# Patient Record
Sex: FEMALE | NOT HISPANIC OR LATINO | Employment: UNEMPLOYED | URBAN - METROPOLITAN AREA
[De-identification: names, ages, dates, MRNs, and addresses within clinical notes are randomized per-mention and may not be internally consistent; named-entity substitution may affect disease eponyms.]

---

## 2020-09-02 ENCOUNTER — APPOINTMENT (OUTPATIENT)
Dept: GENERAL RADIOLOGY | Facility: HOSPITAL | Age: 75
End: 2020-09-02

## 2020-09-02 ENCOUNTER — HOSPITAL ENCOUNTER (EMERGENCY)
Facility: HOSPITAL | Age: 75
Discharge: HOME | End: 2020-09-03
Attending: EMERGENCY MEDICINE | Admitting: EMERGENCY MEDICINE

## 2020-09-02 DIAGNOSIS — J84.10 PULMONARY FIBROSIS (HCC): ICD-10-CM

## 2020-09-02 DIAGNOSIS — I47.1 PAROXYSMAL SVT (SUPRAVENTRICULAR TACHYCARDIA) (HCC): Primary | ICD-10-CM

## 2020-09-02 LAB
ALBUMIN SERPL-MCNC: 4.2 G/DL (ref 3.5–5.2)
ALBUMIN/GLOB SERPL: 1.2 G/DL
ALP SERPL-CCNC: 109 U/L (ref 39–117)
ALT SERPL W P-5'-P-CCNC: 21 U/L (ref 1–33)
ANION GAP SERPL CALCULATED.3IONS-SCNC: 9 MMOL/L (ref 5–15)
AST SERPL-CCNC: 28 U/L (ref 1–32)
BASOPHILS # BLD AUTO: 0.06 10*3/MM3 (ref 0–0.2)
BASOPHILS NFR BLD AUTO: 0.7 % (ref 0–1.5)
BILIRUB SERPL-MCNC: 0.2 MG/DL (ref 0–1.2)
BUN SERPL-MCNC: 12 MG/DL (ref 8–23)
BUN/CREAT SERPL: 16.9 (ref 7–25)
CALCIUM SPEC-SCNC: 9 MG/DL (ref 8.6–10.5)
CHLORIDE SERPL-SCNC: 100 MMOL/L (ref 98–107)
CO2 SERPL-SCNC: 26 MMOL/L (ref 22–29)
CREAT SERPL-MCNC: 0.71 MG/DL (ref 0.57–1)
DEPRECATED RDW RBC AUTO: 46.5 FL (ref 37–54)
EOSINOPHIL # BLD AUTO: 0.61 10*3/MM3 (ref 0–0.4)
EOSINOPHIL NFR BLD AUTO: 7 % (ref 0.3–6.2)
ERYTHROCYTE [DISTWIDTH] IN BLOOD BY AUTOMATED COUNT: 13.2 % (ref 12.3–15.4)
GFR SERPL CREATININE-BSD FRML MDRD: 80 ML/MIN/1.73
GFR SERPL CREATININE-BSD FRML MDRD: 97 ML/MIN/1.73
GLOBULIN UR ELPH-MCNC: 3.4 GM/DL
GLUCOSE SERPL-MCNC: 116 MG/DL (ref 65–99)
HCT VFR BLD AUTO: 40.7 % (ref 34–46.6)
HGB BLD-MCNC: 13.2 G/DL (ref 12–15.9)
HOLD SPECIMEN: NORMAL
HOLD SPECIMEN: NORMAL
IMM GRANULOCYTES # BLD AUTO: 0.05 10*3/MM3 (ref 0–0.05)
IMM GRANULOCYTES NFR BLD AUTO: 0.6 % (ref 0–0.5)
LIPASE SERPL-CCNC: 29 U/L (ref 13–60)
LYMPHOCYTES # BLD AUTO: 1.58 10*3/MM3 (ref 0.7–3.1)
LYMPHOCYTES NFR BLD AUTO: 18 % (ref 19.6–45.3)
MCH RBC QN AUTO: 31 PG (ref 26.6–33)
MCHC RBC AUTO-ENTMCNC: 32.4 G/DL (ref 31.5–35.7)
MCV RBC AUTO: 95.5 FL (ref 79–97)
MONOCYTES # BLD AUTO: 1.03 10*3/MM3 (ref 0.1–0.9)
MONOCYTES NFR BLD AUTO: 11.7 % (ref 5–12)
NEUTROPHILS NFR BLD AUTO: 5.44 10*3/MM3 (ref 1.7–7)
NEUTROPHILS NFR BLD AUTO: 62 % (ref 42.7–76)
NRBC BLD AUTO-RTO: 0 /100 WBC (ref 0–0.2)
NT-PROBNP SERPL-MCNC: 95.3 PG/ML (ref 0–1800)
PLATELET # BLD AUTO: 263 10*3/MM3 (ref 140–450)
PMV BLD AUTO: 9.6 FL (ref 6–12)
POTASSIUM SERPL-SCNC: 3.9 MMOL/L (ref 3.5–5.2)
PROT SERPL-MCNC: 7.6 G/DL (ref 6–8.5)
RBC # BLD AUTO: 4.26 10*6/MM3 (ref 3.77–5.28)
SODIUM SERPL-SCNC: 135 MMOL/L (ref 136–145)
TROPONIN T SERPL-MCNC: <0.01 NG/ML (ref 0–0.03)
TROPONIN T SERPL-MCNC: <0.01 NG/ML (ref 0–0.03)
WBC # BLD AUTO: 8.77 10*3/MM3 (ref 3.4–10.8)
WHOLE BLOOD HOLD SPECIMEN: NORMAL

## 2020-09-02 PROCEDURE — 93005 ELECTROCARDIOGRAM TRACING: CPT | Performed by: EMERGENCY MEDICINE

## 2020-09-02 PROCEDURE — 85025 COMPLETE CBC W/AUTO DIFF WBC: CPT | Performed by: EMERGENCY MEDICINE

## 2020-09-02 PROCEDURE — 83880 ASSAY OF NATRIURETIC PEPTIDE: CPT | Performed by: EMERGENCY MEDICINE

## 2020-09-02 PROCEDURE — 99284 EMERGENCY DEPT VISIT MOD MDM: CPT

## 2020-09-02 PROCEDURE — 71045 X-RAY EXAM CHEST 1 VIEW: CPT

## 2020-09-02 PROCEDURE — 83690 ASSAY OF LIPASE: CPT | Performed by: EMERGENCY MEDICINE

## 2020-09-02 PROCEDURE — 84484 ASSAY OF TROPONIN QUANT: CPT | Performed by: EMERGENCY MEDICINE

## 2020-09-02 PROCEDURE — 80053 COMPREHEN METABOLIC PANEL: CPT | Performed by: EMERGENCY MEDICINE

## 2020-09-02 RX ORDER — ASPIRIN 81 MG/1
324 TABLET, CHEWABLE ORAL ONCE
Status: COMPLETED | OUTPATIENT
Start: 2020-09-02 | End: 2020-09-02

## 2020-09-02 RX ORDER — SODIUM CHLORIDE 0.9 % (FLUSH) 0.9 %
10 SYRINGE (ML) INJECTION AS NEEDED
Status: DISCONTINUED | OUTPATIENT
Start: 2020-09-02 | End: 2020-09-03 | Stop reason: HOSPADM

## 2020-09-02 RX ADMIN — ASPIRIN 243 MG: 81 TABLET, CHEWABLE ORAL at 20:13

## 2020-09-03 VITALS
OXYGEN SATURATION: 94 % | SYSTOLIC BLOOD PRESSURE: 124 MMHG | WEIGHT: 200 LBS | DIASTOLIC BLOOD PRESSURE: 91 MMHG | HEART RATE: 92 BPM | BODY MASS INDEX: 37.76 KG/M2 | TEMPERATURE: 98.2 F | RESPIRATION RATE: 16 BRPM | HEIGHT: 61 IN

## 2020-09-03 LAB — WHOLE BLOOD HOLD SPECIMEN: NORMAL

## 2020-09-03 NOTE — ED PROVIDER NOTES
Subjective   Ms. Destiney Green is a 75 y.o female presenting to the emergency department via EMS with complaints of chest pain. Today at approximately 1800, she complains of sudden chest pain, shortness of breath, and weakness while at home. She moved to the couch and then felt she was unable to move. EMS was called and on arrival, they note she was in supraventricular tachycardia for which she was successfully cardioverted via Adenosine 6 mg. While in route, she had an episode of vomiting and EMS administered Zofran 4 mg and she confirms her nausea is currently resolved. She confirms feeling better overall but complains of persistent chest tightness, fatigue, and weakness. She confirms she had a similar episode 3 months ago but today's was far worse. She denies abdominal pain, fever, and chills. There are no other acute symptoms at this time.      History provided by:  Patient and EMS personnel  Chest Pain   Pain location:  Substernal area  Pain quality: tightness    Pain radiates to:  Does not radiate  Pain severity:  Moderate  Onset quality:  Sudden  Duration:  2 hours  Timing:  Constant  Progression:  Unchanged  Chronicity:  New  Relieved by:  None tried  Worsened by:  Nothing  Ineffective treatments:  None tried  Associated symptoms: fatigue, nausea, palpitations, shortness of breath, vomiting and weakness    Associated symptoms: no abdominal pain, no diaphoresis, no fever and no syncope    Nausea:     Progression:  Resolved  Vomiting:     Number of occurrences:  1    Progression:  Resolved      Review of Systems   Constitutional: Positive for fatigue. Negative for chills, diaphoresis and fever.   Respiratory: Positive for chest tightness and shortness of breath.    Cardiovascular: Positive for chest pain and palpitations. Negative for syncope.   Gastrointestinal: Positive for nausea and vomiting. Negative for abdominal pain, blood in stool, constipation and diarrhea.   Neurological: Positive for weakness.    All other systems reviewed and are negative.      No past medical history on file.    No Known Allergies    No past surgical history on file.    No family history on file.    Social History     Socioeconomic History   • Marital status:      Spouse name: Not on file   • Number of children: Not on file   • Years of education: Not on file   • Highest education level: Not on file         Objective   Physical Exam   Constitutional: She is oriented to person, place, and time. She appears well-developed and well-nourished. No distress. She is not intubated.   HENT:   Head: Normocephalic and atraumatic.   Eyes: Conjunctivae are normal. No scleral icterus.   Neck: Normal range of motion.   Cardiovascular: Regular rhythm and intact distal pulses. Tachycardia present. Exam reveals no gallop and no friction rub.   No murmur heard.  Pulmonary/Chest: Effort normal and breath sounds normal. No accessory muscle usage or stridor. No tachypnea and no bradypnea. She is not intubated. No respiratory distress. She has no decreased breath sounds. She has no wheezes. She has no rhonchi. She has no rales.   Lungs are clear to auscultation bilaterally. Airway is intact.   Abdominal: Soft. There is no tenderness. There is no rigidity, no rebound, no guarding and no CVA tenderness.   Musculoskeletal: Normal range of motion. She exhibits no edema, tenderness or deformity.   Neurological: She is alert and oriented to person, place, and time.   Skin: Skin is warm and dry. She is not diaphoretic.   Psychiatric: She has a normal mood and affect. Her behavior is normal.   Nursing note and vitals reviewed.      Procedures         ED Course  ED Course as of Sep 03 0033   Wed Sep 02, 2020   2030   COVID-19 RISK SCREEN    Has the patient had close contact without PPE with a lab confirmed COVID-19 (+) person or a person under investigation (PUI) for COVID-19 infection?  -- No     Has the patient had respiratory symptoms, worsened/new cough  and/or SOA, unexplained fever, or sudden loss of smell and/or taste in the past 7 days? --  Yes    Does the patient have baseline higher exposure risk such as working in healthcare field or currently residing in healthcare facility?  --  No          [HV]   2106 Troponin T: <0.010 [RS]   2106 proBNP: 95.3 [RS]   2240 Patient remains stable.  Vital signs are within normal limits.  Heart rate 88 bpm and a normal sinus rhythm.  Obtaining a second set of cardiac test.    [RS]   2339 Troponin T: <0.010 [RS]   2339 Patient is resting comfortably.  Patient's cardiac enzymes have remained negative.  We will plan to discharge her home to follow-up with our rhythm clinic as well as with cardiology. I had a discussion with the patient/family regarding diagnosis, diagnostic results, treatment plan, and medications.  The patient/family indicated understanding of these instructions.  I spent adequate time at the bedside proceeding discharge necessary to personally discuss the aftercare instructions, giving patient education, providing explanations of the results of our evaluations/findings, and my decision making to assure that the patient/family understand the plan of care.  Time was allotted to answer questions at that time and throughout the ED course.  Emphasis was placed on timely follow-up after discharge.  I also discussed the potential for the development of an acute emergent condition requiring further evaluation, admission, or even surgical intervention. I discussed that we found nothing during the visit today indicating the need for further workup, admission, or the presence of an unstable medical condition.  I encouraged the patient to return to the emergency department immediately for ANY concerns, worsening, new complaints, or if symptoms persist and unable to seek follow-up in a timely fashion.  The patient/family expressed understanding and agreement with this plan.     [RS]      ED Course User Index  [HV]  Holly Edgar  [RS] Lee Castro MD     Recent Results (from the past 24 hour(s))   Troponin    Collection Time: 09/02/20  8:13 PM   Result Value Ref Range    Troponin T <0.010 0.000 - 0.030 ng/mL   Comprehensive Metabolic Panel    Collection Time: 09/02/20  8:13 PM   Result Value Ref Range    Glucose 116 (H) 65 - 99 mg/dL    BUN 12 8 - 23 mg/dL    Creatinine 0.71 0.57 - 1.00 mg/dL    Sodium 135 (L) 136 - 145 mmol/L    Potassium 3.9 3.5 - 5.2 mmol/L    Chloride 100 98 - 107 mmol/L    CO2 26.0 22.0 - 29.0 mmol/L    Calcium 9.0 8.6 - 10.5 mg/dL    Total Protein 7.6 6.0 - 8.5 g/dL    Albumin 4.20 3.50 - 5.20 g/dL    ALT (SGPT) 21 1 - 33 U/L    AST (SGOT) 28 1 - 32 U/L    Alkaline Phosphatase 109 39 - 117 U/L    Total Bilirubin 0.2 0.0 - 1.2 mg/dL    eGFR Non African Amer 80 >60 mL/min/1.73    eGFR  African Amer 97 >60 mL/min/1.73    Globulin 3.4 gm/dL    A/G Ratio 1.2 g/dL    BUN/Creatinine Ratio 16.9 7.0 - 25.0    Anion Gap 9.0 5.0 - 15.0 mmol/L   Lipase    Collection Time: 09/02/20  8:13 PM   Result Value Ref Range    Lipase 29 13 - 60 U/L   BNP    Collection Time: 09/02/20  8:13 PM   Result Value Ref Range    proBNP 95.3 0.0-1,800.0 pg/mL   Light Blue Top    Collection Time: 09/02/20  8:13 PM   Result Value Ref Range    Extra Tube hold for add-on    Green Top (Gel)    Collection Time: 09/02/20  8:13 PM   Result Value Ref Range    Extra Tube Hold for add-ons.    Gold Top - SST    Collection Time: 09/02/20  8:13 PM   Result Value Ref Range    Extra Tube Hold for add-ons.    Lavender Top    Collection Time: 09/02/20 10:57 PM   Result Value Ref Range    Extra Tube hold for add-on    CBC Auto Differential    Collection Time: 09/02/20 10:57 PM   Result Value Ref Range    WBC 8.77 3.40 - 10.80 10*3/mm3    RBC 4.26 3.77 - 5.28 10*6/mm3    Hemoglobin 13.2 12.0 - 15.9 g/dL    Hematocrit 40.7 34.0 - 46.6 %    MCV 95.5 79.0 - 97.0 fL    MCH 31.0 26.6 - 33.0 pg    MCHC 32.4 31.5 - 35.7 g/dL    RDW 13.2 12.3 - 15.4 %  "   RDW-SD 46.5 37.0 - 54.0 fl    MPV 9.6 6.0 - 12.0 fL    Platelets 263 140 - 450 10*3/mm3    Neutrophil % 62.0 42.7 - 76.0 %    Lymphocyte % 18.0 (L) 19.6 - 45.3 %    Monocyte % 11.7 5.0 - 12.0 %    Eosinophil % 7.0 (H) 0.3 - 6.2 %    Basophil % 0.7 0.0 - 1.5 %    Immature Grans % 0.6 (H) 0.0 - 0.5 %    Neutrophils, Absolute 5.44 1.70 - 7.00 10*3/mm3    Lymphocytes, Absolute 1.58 0.70 - 3.10 10*3/mm3    Monocytes, Absolute 1.03 (H) 0.10 - 0.90 10*3/mm3    Eosinophils, Absolute 0.61 (H) 0.00 - 0.40 10*3/mm3    Basophils, Absolute 0.06 0.00 - 0.20 10*3/mm3    Immature Grans, Absolute 0.05 0.00 - 0.05 10*3/mm3    nRBC 0.0 0.0 - 0.2 /100 WBC   Troponin    Collection Time: 09/02/20 10:57 PM   Result Value Ref Range    Troponin T <0.010 0.000 - 0.030 ng/mL     Note: In addition to lab results from this visit, the labs listed above may include labs taken at another facility or during a different encounter within the last 24 hours. Please correlate lab times with ED admission and discharge times for further clarification of the services performed during this visit.    XR Chest 1 View   Final Result      1. Cardiomegaly with tortuous likely ectatic aorta.   2. There is reticulonodular interstitial change right greater than left lung which could be acute or chronic. If acute asymmetric edema, infection, atypical infection or underlying lung nodules would be included in the differential. There are no prior   films available to confirm stability. Consider CT follow-up.      Signer Name: Bridgett Ibrahim MD    Signed: 9/2/2020 8:42 PM    Workstation Name: [id number]     [facility]        Vitals:    09/02/20 1959 09/02/20 2000 09/02/20 2003 09/02/20 2358   BP:   127/75 124/91   Pulse:   107 92   Resp:   16    Temp:  98.2 °F (36.8 °C)     TempSrc:  Oral     SpO2:   94% 94%   Weight: 90.7 kg (200 lb)      Height: 155 cm (61.02\")        Medications   sodium chloride 0.9 % flush 10 mL (has no " administration in time range)   aspirin chewable tablet 324 mg (243 mg Oral Given 9/2/20 2013)     ECG/EMG Results (last 24 hours)     Procedure Component Value Units Date/Time    ECG 12 Lead [[id number]] Collected:  09/02/20 2002     Updated:  09/02/20 2004    Narrative:       Test Reason : chest pain  Blood Pressure : **/** mmHG  Vent. Rate : 107 BPM     Atrial Rate : 107 BPM     P-R Int : 158 ms          QRS Dur : 122 ms      QT Int : 386 ms       P-R-T Axes : 043 004 012 degrees     QTc Int : 515 ms    Sinus tachycardia  Right bundle branch block  Inferior infarct , age undetermined  Abnormal ECG  No previous ECGs available  Confirmed by JOSHUA FARIA MD ([id number]) on 9/2/2020 8:04:24 PM    Referred By:  GIANA           Confirmed By:JOSHUA FARIA MD        ECG 12 Lead   Final Result   Test Reason : 2nd set   Blood Pressure : **/** mmHG   Vent. Rate : 090 BPM     Atrial Rate : 090 BPM      P-R Int : 160 ms          QRS Dur : 122 ms       QT Int : 410 ms       P-R-T Axes : 050 -01 018 degrees      QTc Int : 501 ms      Normal sinus rhythm   Right bundle branch block   Inferior infarct (cited on or before 02-SEP-2020)   Abnormal ECG   When compared with ECG of 02-SEP-2020 20:02,   No significant change was found   Confirmed by JOSHUA FARIA MD ([id number]) on 9/3/2020 12:02:36 AM      Referred By:             Confirmed By:JOSHUA FARIA MD      ECG 12 Lead   Final Result   Test Reason : chest pain   Blood Pressure : **/** mmHG   Vent. Rate : 107 BPM     Atrial Rate : 107 BPM      P-R Int : 158 ms          QRS Dur : 122 ms       QT Int : 386 ms       P-R-T Axes : 043 004 012 degrees      QTc Int : 515 ms      Sinus tachycardia   Right bundle branch block   Inferior infarct , age undetermined   Abnormal ECG   No previous ECGs available   Confirmed by JOSHUA FARIA MD ([id number]) on 9/2/2020 8:04:24 PM      Referred By:  GIANA           Confirmed By:JOSHUA FARIA MD                                                 [facility]  Number of  Diagnoses or Management Options  Paroxysmal SVT (supraventricular tachycardia) (CMS/HCC):   Pulmonary fibrosis (CMS/HCC):      Amount and/or Complexity of Data Reviewed  Clinical lab tests: reviewed  Tests in the radiology section of CPT®: reviewed  Obtain history from someone other than the patient: yes  Independent visualization of images, tracings, or specimens: yes        Final diagnoses:   Paroxysmal SVT (supraventricular tachycardia) (CMS/HCC)   Pulmonary fibrosis (CMS/HCC)     EMR Dragon/Transcription disclaimer:   Much of this encounter note is an electronic transcription/translation of spoken language to printed text. The electronic translation of spoken language may permit erroneous, or at times, nonsensical words or phrases to be inadvertently transcribed; Although I have reviewed the note for such errors, some may still exist.       Documentation assistance provided by андрей Edgar.  Information recorded by the андрей was done at my direction and has been verified and validated by me.     Holly Edgar  09/02/20 2030       Lee Castro MD  09/03/20 0033

## 2020-09-09 ENCOUNTER — CONSULT (OUTPATIENT)
Dept: CARDIOLOGY | Facility: CLINIC | Age: 75
End: 2020-09-09

## 2020-09-09 VITALS
TEMPERATURE: 96.9 F | WEIGHT: 198 LBS | HEART RATE: 106 BPM | BODY MASS INDEX: 37.38 KG/M2 | OXYGEN SATURATION: 97 % | HEIGHT: 61 IN | DIASTOLIC BLOOD PRESSURE: 72 MMHG | SYSTOLIC BLOOD PRESSURE: 132 MMHG

## 2020-09-09 DIAGNOSIS — I47.1 SUSTAINED SVT (HCC): Primary | ICD-10-CM

## 2020-09-09 PROBLEM — I47.10 SUSTAINED SVT: Status: ACTIVE | Noted: 2020-09-09

## 2020-09-09 PROCEDURE — 99204 OFFICE O/P NEW MOD 45 MIN: CPT | Performed by: INTERNAL MEDICINE

## 2020-09-09 RX ORDER — DULOXETIN HYDROCHLORIDE 60 MG/1
60 CAPSULE, DELAYED RELEASE ORAL DAILY
COMMUNITY

## 2020-09-09 RX ORDER — PREDNISONE 10 MG/1
20 TABLET ORAL DAILY
COMMUNITY
End: 2020-09-09

## 2020-09-09 RX ORDER — LEVOTHYROXINE SODIUM 0.1 MG/1
100 TABLET ORAL DAILY
COMMUNITY

## 2020-09-09 RX ORDER — CELECOXIB 200 MG/1
200 CAPSULE ORAL DAILY PRN
COMMUNITY

## 2020-09-09 RX ORDER — PANTOPRAZOLE SODIUM 40 MG/1
40 TABLET, DELAYED RELEASE ORAL 2 TIMES DAILY
COMMUNITY

## 2020-09-09 RX ORDER — ALBUTEROL SULFATE 90 UG/1
2 AEROSOL, METERED RESPIRATORY (INHALATION) EVERY 6 HOURS PRN
COMMUNITY

## 2020-09-09 NOTE — PROGRESS NOTES
Electrophysiology Clinic Consult     Destiney Green  1945  [unfilled]  [unfilled]    09/09/20    DATE OF ADMISSION: (Not on file)  [facility] CARDIOLOGY    Provider, No Known  [facility] [id number]    Chief Complaint   Patient presents with   • Rapid Heart Rate     Problem List:  1. SVT  a. ED presentation 9/2/2020 cardioverted with Adenosine 6 mg   2. Abnormal CXR/Pulomonary Fibrosis - followed by pulmonologist in [place]  a. Reticulonodular interstitial change right >left which could be acute or chornic, consider CT follow up  3. RBBB  4. Hypothyroidism  5. GERD  6. ? Loss of consciousness, possible TIA in [place] 2015 - data deficit  7. Surgical History:  a. Cholecystectomy  b. Skin cancer removal  c. Lung biopsy        History of Present Illness:   75 year old female with SVT referred by Dr. Us for SVT. Over the years, she has palpitations for many years, but was told she had anxiety when she presented to the ED always in normal rhythm.  She had an episode in May 2020, of sudden onset of tachycardia which lasted 30 minutes, in which her HR was above 190 bpm. She felt a tightness and heaviness in her chest, along with SOB.  Just last week, she had another episode of sudden onset tachycardia, described as severe,  with chest pain, SOB, and HR was 193 bpm. Prior to this, she had diarrhea (a side effect from a new medication for her pulmonary fibrosis). She had been more tired that day.  EMS was called and was found to be in SVT, Adenosine was given and converted.  She was back in NSR when she arrived at the ED. She was discharged home and not started on any new medications. She has not had another episode since then. She denies syncope with these recent episodes, but daughter says that she had an episode 5 years ago in [place] in which she lost consciousness. She was apparently diagnosed with a blood clot in her brain or TIA, but she does not have a lot  of details about this. She denies history of MI. She had a stress test 6 months ago with Dr. Burgess who she used to see for SOB. The stress test was normal apparently. She also had an echocardiogram within the last year that was normal. No tobacco. No ETOH. She drinks one cup of coffee daily.       No Known Allergies     Cannot display prior to admission medications because the patient has not been admitted in this contact.            Current Outpatient Medications:   •  albuterol sulfate  (90 Base) MCG/ACT inhaler, Inhale 2 puffs Every 6 (Six) Hours As Needed for Wheezing., Disp: , Rfl:   •  celecoxib (CeleBREX) 200 MG capsule, Take 200 mg by mouth Daily., Disp: , Rfl:   •  DULoxetine (CYMBALTA) 60 MG capsule, Take 60 mg by mouth Daily., Disp: , Rfl:   •  Fluticasone-Umeclidin-Vilant (Trelegy Ellipta) 100-62.5-25 MCG/INH aerosol powder , Inhale 1 puff Daily., Disp: , Rfl:   •  levothyroxine (SYNTHROID, LEVOTHROID) 100 MCG tablet, Take 100 mcg by mouth Daily., Disp: , Rfl:   •  pantoprazole (PROTONIX) 40 MG EC tablet, Take 40 mg by mouth 2 (two) times a day., Disp: , Rfl:     Social History     Socioeconomic History   • Marital status:      Spouse name: Not on file   • Number of children: Not on file   • Years of education: Not on file   • Highest education level: Not on file   Tobacco Use   • Smoking status: Former Smoker     Last attempt to quit: 1990     Years since quittin.0   • Smokeless tobacco: Never Used   Substance and Sexual Activity   • Alcohol use: Yes     Alcohol/week: 1.0 standard drinks     Types: 1 Glasses of wine per week   • Drug use: Never       Family History:  Brother had open heart surgery  No known SCD    REVIEW OF SYSTEMS:   CONST:  No weight loss, fever, chills, + weakness + fatigue.   HEENT:  No visual loss, blurred vision, double vision, yellow sclerae.                   No hearing loss, congestion, sore throat.   SKIN:      No rashes, urticaria, ulcers, sores.    "  RESP:     No shortness of breath, hemoptysis, cough, sputum.   GI:           No anorexia, nausea, vomiting, + diarrhea. No abdominal pain, melena.   :         No burning on urination, hematuria or increased frequency.  ENDO:    No diaphoresis, cold or heat intolerance. No polyuria or polydipsia.   NEURO:  No headache, dizziness, syncope, paralysis, ataxia, or parasthesias.                  No change in bowel or bladder control. No history of CVA/+ TIA  MUSC:    No muscle, back pain, joint pain or stiffness.   HEME:    No anemia, bleeding, bruising. ?  history of DVT/PE.  PSYCH:  No history of depression, anxiety    Vitals:    09/09/20 1254   BP: 132/72   Pulse: 106   Temp: 96.9 °F (36.1 °C)   SpO2: 97%   Weight: 89.8 kg (198 lb)   Height: 154.9 cm (61\")                 Physical Exam:  GEN: Well nourished, well-developed, no acute distress  HEENT: Normocephalic, atraumatic, PERRLA, moist mucous membranes  NECK: Supple, NO JVD, no thyromegaly, no lymphadenopathy  CARD: S1S2, RRR, no murmur, gallop, rub, PMI NL  LUNGS: Clear to auscultation, normal respiratory effort  ABDOMEN: Soft, nontender, normal bowel sounds  EXTREMITIES: No gross deformities, no clubbing, cyanosis, or edema  SKIN: Warm, dry, no lesions  NEURO: No focal deficits, alert and oriented x 3  PSYCHIATRIC: Normal affect and mood      I personally viewed and interpreted the patient's EKG/Telemetry/lab data    Lab Results   Component Value Date    GLUCOSE 116 (H) 09/02/2020    CALCIUM 9.0 09/02/2020     (L) 09/02/2020    K 3.9 09/02/2020    CO2 26.0 09/02/2020     09/02/2020    BUN 12 09/02/2020    CREATININE 0.71 09/02/2020    EGFRIFAFRI 97 09/02/2020    EGFRIFNONA 80 09/02/2020    BCR 16.9 09/02/2020    ANIONGAP 9.0 09/02/2020     Lab Results   Component Value Date    WBC 8.77 09/02/2020    HGB 13.2 09/02/2020    HCT 40.7 09/02/2020    MCV 95.5 09/02/2020     09/02/2020     No results found for: INR, PROTIME  No results found for: " TSH, D1WYCWD, Y3FONGR, THYROIDAB    Narrow complex tachycardia 176 bpm without p waves, regular, noted on strips from [employer], which were reviewed on daughter's phone in the office today.     EKG reviewed from ER visit 9/2/2020 shows ST with RBBB, 107 bpm     Procedures      ICD-10-CM ICD-9-CM   1. Sustained SVT (CMS/[facility]) I47.1 427.89       Assessment and Plan:   1. SVT:   - patient with two recent episodes of severe palpitations, most likely clinically consistent with SVT. Also, reviewed her [employer] Watch strips, which show probable SVT. Medications such as BB would most likley not be affective and would worsen her symptoms with pulmonary fibrosis. Would recommend EPS +/- RFA of SVT. The risks, benefits, and alternatives of the procedure have been reviewed and the patient wishes to proceed.     2. RBBB: had echo this year at Dr Burgess. Will get results from his office    Scribed for Jayme Bernard MD by Sherri Ward PA-C. 9/9/2020  13:58     I, Jayme Bernard MD, personally performed the services described in this documentation as scribed by the above named individual in my presence, and it is both accurate and complete.  9/9/2020  14:02

## 2020-09-24 ENCOUNTER — DOCUMENTATION (OUTPATIENT)
Dept: CARDIOLOGY | Facility: CLINIC | Age: 75
End: 2020-09-24

## 2020-09-24 NOTE — PROGRESS NOTES
Received Echo Results: Updated Problem List:  Problem List:  1. SVT  a. ED presentation 9/2/2020 cardioverted with Adenosine 6 mg   b. Echocardiogram 1/29/2020: EF 65-70%, normal diastolic function, mild MR/TR, RVSP 29 mmHg  2. Abnormal CXR/Pulomonary Fibrosis - followed by pulmonologist in [place]  a. Reticulonodular interstitial change right >left which could be acute or chornic, consider CT follow up  3. RBBB  4. Hypothyroidism  5. GERD  6. ? Loss of consciousness, possible TIA in [place] 2015 - data deficit  7. Surgical History:  a. Cholecystectomy  b. Skin cancer removal  c. Lung biopsy

## 2020-10-16 ENCOUNTER — PREP FOR SURGERY (OUTPATIENT)
Dept: OTHER | Facility: HOSPITAL | Age: 75
End: 2020-10-16

## 2020-10-16 DIAGNOSIS — I47.1 SUSTAINED SVT (HCC): Primary | ICD-10-CM

## 2020-10-16 RX ORDER — SODIUM CHLORIDE 0.9 % (FLUSH) 0.9 %
10 SYRINGE (ML) INJECTION AS NEEDED
Status: CANCELLED | OUTPATIENT
Start: 2020-10-16

## 2020-10-16 RX ORDER — NITROGLYCERIN 0.4 MG/1
0.4 TABLET SUBLINGUAL
Status: CANCELLED | OUTPATIENT
Start: 2020-10-16

## 2020-10-16 RX ORDER — SODIUM CHLORIDE 0.9 % (FLUSH) 0.9 %
3 SYRINGE (ML) INJECTION EVERY 12 HOURS SCHEDULED
Status: CANCELLED | OUTPATIENT
Start: 2020-10-16

## 2020-10-16 RX ORDER — ACETAMINOPHEN 325 MG/1
650 TABLET ORAL EVERY 4 HOURS PRN
Status: CANCELLED | OUTPATIENT
Start: 2020-10-16

## 2020-10-16 RX ORDER — SODIUM CHLORIDE 9 MG/ML
1 INJECTION, SOLUTION INTRAVENOUS CONTINUOUS
Status: CANCELLED | OUTPATIENT
Start: 2020-10-16 | End: 2020-10-16

## 2020-10-23 ENCOUNTER — APPOINTMENT (OUTPATIENT)
Dept: PREADMISSION TESTING | Facility: HOSPITAL | Age: 75
End: 2020-10-23

## 2020-10-23 DIAGNOSIS — I47.1 SUSTAINED SVT (HCC): ICD-10-CM

## 2020-10-23 LAB
ANION GAP SERPL CALCULATED.3IONS-SCNC: 9 MMOL/L (ref 5–15)
BUN SERPL-MCNC: 16 MG/DL (ref 8–23)
BUN/CREAT SERPL: 19.5 (ref 7–25)
CALCIUM SPEC-SCNC: 9.4 MG/DL (ref 8.6–10.5)
CHLORIDE SERPL-SCNC: 101 MMOL/L (ref 98–107)
CO2 SERPL-SCNC: 29 MMOL/L (ref 22–29)
CREAT SERPL-MCNC: 0.82 MG/DL (ref 0.57–1)
DEPRECATED RDW RBC AUTO: 46.9 FL (ref 37–54)
ERYTHROCYTE [DISTWIDTH] IN BLOOD BY AUTOMATED COUNT: 13.4 % (ref 12.3–15.4)
GFR SERPL CREATININE-BSD FRML MDRD: 68 ML/MIN/1.73
GFR SERPL CREATININE-BSD FRML MDRD: 82 ML/MIN/1.73
GLUCOSE SERPL-MCNC: 90 MG/DL (ref 65–99)
HBA1C MFR BLD: 5.7 % (ref 4.8–5.6)
HCT VFR BLD AUTO: 42 % (ref 34–46.6)
HGB BLD-MCNC: 13.5 G/DL (ref 12–15.9)
INR PPP: 0.98 (ref 0.85–1.16)
MCH RBC QN AUTO: 30.3 PG (ref 26.6–33)
MCHC RBC AUTO-ENTMCNC: 32.1 G/DL (ref 31.5–35.7)
MCV RBC AUTO: 94.4 FL (ref 79–97)
PLATELET # BLD AUTO: 283 10*3/MM3 (ref 140–450)
PMV BLD AUTO: 9.3 FL (ref 6–12)
POTASSIUM SERPL-SCNC: 4.3 MMOL/L (ref 3.5–5.2)
PROTHROMBIN TIME: 12.7 SECONDS (ref 11.5–14)
RBC # BLD AUTO: 4.45 10*6/MM3 (ref 3.77–5.28)
SODIUM SERPL-SCNC: 139 MMOL/L (ref 136–145)
WBC # BLD AUTO: 9.31 10*3/MM3 (ref 3.4–10.8)

## 2020-10-23 PROCEDURE — 85027 COMPLETE CBC AUTOMATED: CPT | Performed by: PHYSICIAN ASSISTANT

## 2020-10-23 PROCEDURE — 85610 PROTHROMBIN TIME: CPT | Performed by: PHYSICIAN ASSISTANT

## 2020-10-23 PROCEDURE — 80048 BASIC METABOLIC PNL TOTAL CA: CPT | Performed by: PHYSICIAN ASSISTANT

## 2020-10-23 PROCEDURE — U0004 COV-19 TEST NON-CDC HGH THRU: HCPCS

## 2020-10-23 PROCEDURE — 83036 HEMOGLOBIN GLYCOSYLATED A1C: CPT | Performed by: INTERNAL MEDICINE

## 2020-10-23 PROCEDURE — C9803 HOPD COVID-19 SPEC COLLECT: HCPCS

## 2020-10-23 PROCEDURE — 36415 COLL VENOUS BLD VENIPUNCTURE: CPT

## 2020-10-23 RX ORDER — ASPIRIN 81 MG/1
81 TABLET ORAL DAILY
COMMUNITY

## 2020-10-23 RX ORDER — GUAIFENESIN 400 MG/1
400 TABLET ORAL
COMMUNITY

## 2020-10-23 RX ORDER — CHOLECALCIFEROL (VITAMIN D3) 25 MCG
1000 CAPSULE ORAL DAILY
COMMUNITY

## 2020-10-23 RX ORDER — ASCORBIC ACID 100 MG
1000 TABLET,CHEWABLE ORAL DAILY
COMMUNITY

## 2020-10-23 NOTE — PAT

## 2020-10-23 NOTE — DISCHARGE INSTRUCTIONS

## 2020-10-24 LAB — SARS-COV-2 RNA RESP QL NAA+PROBE: NOT DETECTED

## 2020-10-26 ENCOUNTER — HOSPITAL ENCOUNTER (OUTPATIENT)
Facility: HOSPITAL | Age: 75
Discharge: HOME | End: 2020-10-27
Attending: INTERNAL MEDICINE | Admitting: INTERNAL MEDICINE

## 2020-10-26 DIAGNOSIS — I47.1 SUSTAINED SVT (HCC): ICD-10-CM

## 2020-10-26 PROCEDURE — 99152 MOD SED SAME PHYS/QHP 5/>YRS: CPT | Performed by: INTERNAL MEDICINE

## 2020-10-26 PROCEDURE — 93613 INTRACARDIAC EPHYS 3D MAPG: CPT | Performed by: INTERNAL MEDICINE

## 2020-10-26 PROCEDURE — 93653 COMPRE EP EVAL TX SVT: CPT | Performed by: INTERNAL MEDICINE

## 2020-10-26 PROCEDURE — 25010000002 FENTANYL CITRATE (PF) 100 MCG/2ML SOLUTION: Performed by: INTERNAL MEDICINE

## 2020-10-26 PROCEDURE — C1894 INTRO/SHEATH, NON-LASER: HCPCS | Performed by: INTERNAL MEDICINE

## 2020-10-26 PROCEDURE — 99153 MOD SED SAME PHYS/QHP EA: CPT | Performed by: INTERNAL MEDICINE

## 2020-10-26 PROCEDURE — 25010000002 MIDAZOLAM PER 1 MG: Performed by: INTERNAL MEDICINE

## 2020-10-26 PROCEDURE — 25010000003 LIDOCAINE 1 % SOLUTION: Performed by: INTERNAL MEDICINE

## 2020-10-26 PROCEDURE — C1730 CATH, EP, 19 OR FEW ELECT: HCPCS | Performed by: INTERNAL MEDICINE

## 2020-10-26 PROCEDURE — 25010000002 ONDANSETRON PER 1 MG: Performed by: INTERNAL MEDICINE

## 2020-10-26 PROCEDURE — S0260 H&P FOR SURGERY: HCPCS | Performed by: PHYSICIAN ASSISTANT

## 2020-10-26 PROCEDURE — 93623 PRGRMD STIMJ&PACG IV RX NFS: CPT | Performed by: INTERNAL MEDICINE

## 2020-10-26 PROCEDURE — C1732 CATH, EP, DIAG/ABL, 3D/VECT: HCPCS | Performed by: INTERNAL MEDICINE

## 2020-10-26 PROCEDURE — 93621 COMP EP EVL L PAC&REC C SINS: CPT | Performed by: INTERNAL MEDICINE

## 2020-10-26 RX ORDER — LIDOCAINE HYDROCHLORIDE 10 MG/ML
INJECTION, SOLUTION INFILTRATION; PERINEURAL AS NEEDED
Status: DISCONTINUED | OUTPATIENT
Start: 2020-10-26 | End: 2020-10-26 | Stop reason: HOSPADM

## 2020-10-26 RX ORDER — SODIUM CHLORIDE 9 MG/ML
INJECTION, SOLUTION INTRAVENOUS CONTINUOUS PRN
Status: COMPLETED | OUTPATIENT
Start: 2020-10-26 | End: 2020-10-26

## 2020-10-26 RX ORDER — PANTOPRAZOLE SODIUM 40 MG/1
40 TABLET, DELAYED RELEASE ORAL 2 TIMES DAILY
Status: DISCONTINUED | OUTPATIENT
Start: 2020-10-26 | End: 2020-10-27 | Stop reason: HOSPADM

## 2020-10-26 RX ORDER — CELECOXIB 200 MG/1
200 CAPSULE ORAL DAILY
Status: DISCONTINUED | OUTPATIENT
Start: 2020-10-26 | End: 2020-10-27 | Stop reason: HOSPADM

## 2020-10-26 RX ORDER — SODIUM CHLORIDE 9 MG/ML
1 INJECTION, SOLUTION INTRAVENOUS CONTINUOUS
Status: ACTIVE | OUTPATIENT
Start: 2020-10-26 | End: 2020-10-26

## 2020-10-26 RX ORDER — ASPIRIN 81 MG/1
81 TABLET ORAL DAILY
Status: DISCONTINUED | OUTPATIENT
Start: 2020-10-26 | End: 2020-10-27 | Stop reason: HOSPADM

## 2020-10-26 RX ORDER — SODIUM CHLORIDE 0.9 % (FLUSH) 0.9 %
10 SYRINGE (ML) INJECTION AS NEEDED
Status: DISCONTINUED | OUTPATIENT
Start: 2020-10-26 | End: 2020-10-26 | Stop reason: HOSPADM

## 2020-10-26 RX ORDER — ACETAMINOPHEN 325 MG/1
650 TABLET ORAL EVERY 4 HOURS PRN
Status: DISCONTINUED | OUTPATIENT
Start: 2020-10-26 | End: 2020-10-26 | Stop reason: HOSPADM

## 2020-10-26 RX ORDER — ACETAMINOPHEN 650 MG/1
650 SUPPOSITORY RECTAL EVERY 4 HOURS PRN
Status: DISCONTINUED | OUTPATIENT
Start: 2020-10-26 | End: 2020-10-27 | Stop reason: HOSPADM

## 2020-10-26 RX ORDER — ONDANSETRON 2 MG/ML
INJECTION INTRAMUSCULAR; INTRAVENOUS AS NEEDED
Status: DISCONTINUED | OUTPATIENT
Start: 2020-10-26 | End: 2020-10-26 | Stop reason: HOSPADM

## 2020-10-26 RX ORDER — HYDROCODONE BITARTRATE AND ACETAMINOPHEN 5; 325 MG/1; MG/1
1 TABLET ORAL EVERY 4 HOURS PRN
Status: DISCONTINUED | OUTPATIENT
Start: 2020-10-26 | End: 2020-10-27 | Stop reason: HOSPADM

## 2020-10-26 RX ORDER — LEVOTHYROXINE SODIUM 0.1 MG/1
100 TABLET ORAL
Status: DISCONTINUED | OUTPATIENT
Start: 2020-10-27 | End: 2020-10-27 | Stop reason: HOSPADM

## 2020-10-26 RX ORDER — SODIUM CHLORIDE 0.9 % (FLUSH) 0.9 %
3 SYRINGE (ML) INJECTION EVERY 12 HOURS SCHEDULED
Status: DISCONTINUED | OUTPATIENT
Start: 2020-10-26 | End: 2020-10-26 | Stop reason: HOSPADM

## 2020-10-26 RX ORDER — FENTANYL CITRATE 50 UG/ML
INJECTION, SOLUTION INTRAMUSCULAR; INTRAVENOUS AS NEEDED
Status: DISCONTINUED | OUTPATIENT
Start: 2020-10-26 | End: 2020-10-26 | Stop reason: HOSPADM

## 2020-10-26 RX ORDER — ONDANSETRON 2 MG/ML
4 INJECTION INTRAMUSCULAR; INTRAVENOUS EVERY 6 HOURS PRN
Status: DISCONTINUED | OUTPATIENT
Start: 2020-10-26 | End: 2020-10-27 | Stop reason: HOSPADM

## 2020-10-26 RX ORDER — DULOXETIN HYDROCHLORIDE 60 MG/1
60 CAPSULE, DELAYED RELEASE ORAL DAILY
Status: DISCONTINUED | OUTPATIENT
Start: 2020-10-27 | End: 2020-10-27 | Stop reason: HOSPADM

## 2020-10-26 RX ORDER — MIDAZOLAM HYDROCHLORIDE 1 MG/ML
INJECTION INTRAMUSCULAR; INTRAVENOUS AS NEEDED
Status: DISCONTINUED | OUTPATIENT
Start: 2020-10-26 | End: 2020-10-26 | Stop reason: HOSPADM

## 2020-10-26 RX ORDER — ACETAMINOPHEN 325 MG/1
650 TABLET ORAL EVERY 4 HOURS PRN
Status: DISCONTINUED | OUTPATIENT
Start: 2020-10-26 | End: 2020-10-27 | Stop reason: HOSPADM

## 2020-10-26 RX ORDER — NITROGLYCERIN 0.4 MG/1
0.4 TABLET SUBLINGUAL
Status: DISCONTINUED | OUTPATIENT
Start: 2020-10-26 | End: 2020-10-26 | Stop reason: HOSPADM

## 2020-10-26 RX ADMIN — PANTOPRAZOLE SODIUM 40 MG: 40 TABLET, DELAYED RELEASE ORAL at 22:44

## 2020-10-26 RX ADMIN — ASPIRIN 81 MG: 81 TABLET, COATED ORAL at 22:43

## 2020-10-27 VITALS
HEART RATE: 80 BPM | BODY MASS INDEX: 36.93 KG/M2 | WEIGHT: 195.6 LBS | RESPIRATION RATE: 16 BRPM | TEMPERATURE: 98.2 F | DIASTOLIC BLOOD PRESSURE: 74 MMHG | OXYGEN SATURATION: 95 % | HEIGHT: 61 IN | SYSTOLIC BLOOD PRESSURE: 125 MMHG

## 2020-10-27 PROCEDURE — 93010 ELECTROCARDIOGRAM REPORT: CPT | Performed by: INTERNAL MEDICINE

## 2020-10-27 PROCEDURE — 99212 OFFICE O/P EST SF 10 MIN: CPT | Performed by: INTERNAL MEDICINE

## 2020-10-27 PROCEDURE — 93005 ELECTROCARDIOGRAM TRACING: CPT | Performed by: INTERNAL MEDICINE

## 2020-10-27 RX ADMIN — DULOXETINE HYDROCHLORIDE 60 MG: 60 CAPSULE, DELAYED RELEASE ORAL at 08:44

## 2020-10-27 RX ADMIN — PANTOPRAZOLE SODIUM 40 MG: 40 TABLET, DELAYED RELEASE ORAL at 08:44

## 2020-10-27 RX ADMIN — ASPIRIN 81 MG: 81 TABLET, COATED ORAL at 08:45

## 2020-10-27 RX ADMIN — LEVOTHYROXINE SODIUM 100 MCG: 100 TABLET ORAL at 05:13

## 2021-01-19 ENCOUNTER — IMMUNIZATION (OUTPATIENT)
Dept: VACCINE CLINIC | Facility: HOSPITAL | Age: 76
End: 2021-01-19

## 2021-01-19 PROCEDURE — 91300 HC SARSCOV02 VAC 30MCG/0.3ML IM: CPT | Performed by: INTERNAL MEDICINE

## 2021-01-19 PROCEDURE — 0001A: CPT | Performed by: INTERNAL MEDICINE

## 2021-02-09 ENCOUNTER — IMMUNIZATION (OUTPATIENT)
Dept: VACCINE CLINIC | Facility: HOSPITAL | Age: 76
End: 2021-02-09

## 2021-02-09 PROCEDURE — 91300 HC SARSCOV02 VAC 30MCG/0.3ML IM: CPT | Performed by: INTERNAL MEDICINE

## 2021-02-09 PROCEDURE — 0002A: CPT | Performed by: INTERNAL MEDICINE

## 2021-02-24 ENCOUNTER — OFFICE VISIT (OUTPATIENT)
Dept: CARDIOLOGY | Facility: CLINIC | Age: 76
End: 2021-02-24

## 2021-02-24 VITALS
DIASTOLIC BLOOD PRESSURE: 62 MMHG | HEIGHT: 61 IN | HEART RATE: 74 BPM | WEIGHT: 206 LBS | SYSTOLIC BLOOD PRESSURE: 122 MMHG | OXYGEN SATURATION: 93 % | BODY MASS INDEX: 38.89 KG/M2

## 2021-02-24 DIAGNOSIS — I47.1 SUSTAINED SVT (HCC): Primary | ICD-10-CM

## 2021-02-24 DIAGNOSIS — J84.10 PULMONARY FIBROSIS (HCC): ICD-10-CM

## 2021-02-24 PROCEDURE — 93000 ELECTROCARDIOGRAM COMPLETE: CPT | Performed by: INTERNAL MEDICINE

## 2021-02-24 PROCEDURE — 99213 OFFICE O/P EST LOW 20 MIN: CPT | Performed by: INTERNAL MEDICINE

## 2021-02-24 RX ORDER — PIRFENIDONE 801 MG/1
TABLET, COATED ORAL 3 TIMES DAILY
COMMUNITY

## 2021-02-24 NOTE — PROGRESS NOTES
Destiney Green  1945  [phone]  [phone] (work)      02/24/2021    Location:    Cuate Kramer MD  [address] [place] [address] DR [address] [place] [address]    Chief Complaint   Patient presents with   • sustained SVT       Problem List:  1. SVT  a. ED presentation 9/2/2020 cardioverted with Adenosine 6 mg   b. Echocardiogram 1/29/2020 LVEF 65% mild MR and mild TR.  c. 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 Abnormal CXR/Pulomonary Fibrosis - followed by pulmonologist in [place]  a. Reticulonodular interstitial change right >left which could be acute or chornic, consider CT follow up  3. RBBB  4. Hypothyroidism  5. GERD  6. ? Loss of consciousness, possible TIA in [place] 2015 - data deficit  7. Surgical History:  a. Cholecystectomy  b. Skin cancer removal  c. Lung biopsy  Allergies  No Known Allergies    Current Medications    Current Outpatient Medications:   •  albuterol sulfate  (90 Base) MCG/ACT inhaler, Inhale 2 puffs Every 6 (Six) Hours As Needed for Wheezing., Disp: , Rfl:   •  Ascorbic Acid (Vitamin C) 100 MG chewable tablet, Chew 1,000 mg Daily., Disp: , Rfl:   •  aspirin 81 MG EC tablet, Take 81 mg by mouth Daily., Disp: , Rfl:   •  celecoxib (CeleBREX) 200 MG capsule, Take 200 mg by mouth Daily As Needed., Disp: , Rfl:   •  Cholecalciferol (Vitamin D-3) 25 MCG (1000 UT) capsule, Take 1,000 Units by mouth Daily., Disp: , Rfl:   •  DULoxetine (CYMBALTA) 60 MG capsule, Take 60 mg by mouth Daily., Disp: , Rfl:   •  Fluticasone-Umeclidin-Vilant (Trelegy Ellipta) 100-62.5-25 MCG/INH aerosol powder , Inhale 1 puff Daily., Disp: , Rfl:   •  guaiFENesin (Mucus Relief) 400 MG tablet, Take 400 mg by mouth Every 4 (Four) Hours., Disp: , Rfl:   •  levothyroxine (SYNTHROID, LEVOTHROID) 100 MCG tablet, Take 100 mcg by mouth Daily., Disp: , Rfl:   •  pantoprazole (PROTONIX) 40 MG EC tablet, Take 40 mg by mouth 2 (two) times a day., Disp: , Rfl:   •  Pirfenidone (Esbriet) 801 MG tablet, Take  by mouth 3 (Three) Times a  "Day., Disp: , Rfl:     History of Present Illness   HPI    Pt presents for follow up of SVT/RBBB.  Since the pt has seen us in clinic last, pt underwent catheter ablation of AVNRT.  Since that time she has had no recurrent tachyarrhythmias.  Her biggest issue is progressive shortness of breath related to her pulmonary fibrosis.  She has been started on Esbriet with some improvement but no significant improvement overall.  Denies any syncope, SOB, LH, and dizziness. Denies any hospitalizations, ER visits, bleeding, or TIA/CVA symptoms. Overall feels tired    ROS:  General: + fatigue, No weight gain or loss  Cardiovascular:  Denies CP, PND, syncope, near syncope, edema or palpitations.  Pulmonary:  +HERNÁNDEZ, No cough, or wheezing    Vitals:    02/24/21 1058   BP: 122/62   BP Location: Left arm   Patient Position: Sitting   Cuff Size: Large Adult   Pulse: 74   SpO2: 93%   Weight: 93.4 kg (206 lb)   Height: 154.9 cm (61\")       PE:  General: NAD  Neck: no JVD, no carotid bruits, no TM  Heart RRR, NL S1, S2, S4 present, no rubs, murmurs  Lungs: Diffuse inspiratory rales  Abd: soft, non-tender, NL BS  Ext: No musculoskeletal deformities, no edema, cyanosis, or clubbing  Psych: normal mood and affect    Diagnostic Data:    ECG 12 Lead    Date/Time: 2/24/2021 11:07 AM  Performed by: Jayme Bernard MD  Authorized by: Jayme Bernard MD   Comparison: compared with previous ECG from 10/27/2020  Similar to previous ECG  Rhythm: sinus rhythm  BPM: 70  Conduction: right bundle branch block            1. Sustained SVT (CMS/HCC)    2. Pulmonary fibrosis (CMS/HCC)        Plan:  1) SVT, PVC's s/p RFA (AVnRT); no recurrence doing well.  Doing well, No recurrence.    2) pulmonary fibrosis: Follow-up with pulmonologist and cardiologist.    Patient will be discharged from our clinic since she is doing well from an arrhythmia standpoint.      "

## 2021-08-03 ENCOUNTER — TELEPHONE (OUTPATIENT)
Dept: CARDIOLOGY | Facility: CLINIC | Age: 76
End: 2021-08-03

## 2021-08-03 NOTE — TELEPHONE ENCOUNTER
Patient's daughter is requesting clearance for the patient to have a knee replacement with Dr. Chun.

## 2023-03-27 ENCOUNTER — HOSPITAL ENCOUNTER (EMERGENCY)
Facility: HOSPITAL | Age: 78
Discharge: HOME | End: 2023-03-27
Attending: EMERGENCY MEDICINE | Admitting: EMERGENCY MEDICINE
Payer: MEDICARE

## 2023-03-27 ENCOUNTER — APPOINTMENT (OUTPATIENT)
Dept: GENERAL RADIOLOGY | Facility: HOSPITAL | Age: 78
End: 2023-03-27
Payer: MEDICARE

## 2023-03-27 ENCOUNTER — APPOINTMENT (OUTPATIENT)
Dept: CT IMAGING | Facility: HOSPITAL | Age: 78
End: 2023-03-27
Payer: MEDICARE

## 2023-03-27 VITALS
WEIGHT: 203 LBS | HEART RATE: 80 BPM | BODY MASS INDEX: 35.97 KG/M2 | OXYGEN SATURATION: 94 % | TEMPERATURE: 98.9 F | DIASTOLIC BLOOD PRESSURE: 78 MMHG | HEIGHT: 63 IN | SYSTOLIC BLOOD PRESSURE: 129 MMHG | RESPIRATION RATE: 16 BRPM

## 2023-03-27 DIAGNOSIS — J84.10 PULMONARY FIBROSIS: ICD-10-CM

## 2023-03-27 DIAGNOSIS — U09.9 POST COVID-19 CONDITION, UNSPECIFIED: ICD-10-CM

## 2023-03-27 DIAGNOSIS — R53.1 GENERALIZED WEAKNESS: Primary | ICD-10-CM

## 2023-03-27 LAB
ALBUMIN SERPL-MCNC: 3.9 G/DL (ref 3.5–5.2)
ALBUMIN/GLOB SERPL: 1.3 G/DL
ALP SERPL-CCNC: 101 U/L (ref 39–117)
ALT SERPL W P-5'-P-CCNC: 68 U/L (ref 1–33)
ANION GAP SERPL CALCULATED.3IONS-SCNC: 10 MMOL/L (ref 5–15)
AST SERPL-CCNC: 31 U/L (ref 1–32)
BASOPHILS # BLD AUTO: 0.05 10*3/MM3 (ref 0–0.2)
BASOPHILS NFR BLD AUTO: 0.3 % (ref 0–1.5)
BILIRUB SERPL-MCNC: <0.2 MG/DL (ref 0–1.2)
BILIRUB UR QL STRIP: NEGATIVE
BUN SERPL-MCNC: 17 MG/DL (ref 8–23)
BUN/CREAT SERPL: 20.2 (ref 7–25)
CALCIUM SPEC-SCNC: 8.7 MG/DL (ref 8.6–10.5)
CHLORIDE SERPL-SCNC: 102 MMOL/L (ref 98–107)
CLARITY UR: CLEAR
CO2 SERPL-SCNC: 25 MMOL/L (ref 22–29)
COLOR UR: YELLOW
CREAT SERPL-MCNC: 0.84 MG/DL (ref 0.57–1)
DEPRECATED RDW RBC AUTO: 47.7 FL (ref 37–54)
EGFRCR SERPLBLD CKD-EPI 2021: 71.7 ML/MIN/1.73
EOSINOPHIL # BLD AUTO: 0.38 10*3/MM3 (ref 0–0.4)
EOSINOPHIL NFR BLD AUTO: 2.5 % (ref 0.3–6.2)
ERYTHROCYTE [DISTWIDTH] IN BLOOD BY AUTOMATED COUNT: 13.3 % (ref 12.3–15.4)
GLOBULIN UR ELPH-MCNC: 2.9 GM/DL
GLUCOSE SERPL-MCNC: 136 MG/DL (ref 65–99)
GLUCOSE UR STRIP-MCNC: NEGATIVE MG/DL
HCT VFR BLD AUTO: 40.1 % (ref 34–46.6)
HGB BLD-MCNC: 12.8 G/DL (ref 12–15.9)
HGB UR QL STRIP.AUTO: NEGATIVE
HOLD SPECIMEN: NORMAL
IMM GRANULOCYTES # BLD AUTO: 0.63 10*3/MM3 (ref 0–0.05)
IMM GRANULOCYTES NFR BLD AUTO: 4.2 % (ref 0–0.5)
KETONES UR QL STRIP: NEGATIVE
LEUKOCYTE ESTERASE UR QL STRIP.AUTO: NEGATIVE
LYMPHOCYTES # BLD AUTO: 1.22 10*3/MM3 (ref 0.7–3.1)
LYMPHOCYTES NFR BLD AUTO: 8.2 % (ref 19.6–45.3)
MAGNESIUM SERPL-MCNC: 2.6 MG/DL (ref 1.6–2.4)
MCH RBC QN AUTO: 31.4 PG (ref 26.6–33)
MCHC RBC AUTO-ENTMCNC: 31.9 G/DL (ref 31.5–35.7)
MCV RBC AUTO: 98.3 FL (ref 79–97)
MONOCYTES # BLD AUTO: 0.94 10*3/MM3 (ref 0.1–0.9)
MONOCYTES NFR BLD AUTO: 6.3 % (ref 5–12)
NEUTROPHILS NFR BLD AUTO: 11.71 10*3/MM3 (ref 1.7–7)
NEUTROPHILS NFR BLD AUTO: 78.5 % (ref 42.7–76)
NITRITE UR QL STRIP: NEGATIVE
NRBC BLD AUTO-RTO: 0 /100 WBC (ref 0–0.2)
PH UR STRIP.AUTO: <=5 [PH] (ref 5–8)
PLATELET # BLD AUTO: 259 10*3/MM3 (ref 140–450)
PMV BLD AUTO: 8.8 FL (ref 6–12)
POTASSIUM SERPL-SCNC: 4.6 MMOL/L (ref 3.5–5.2)
PROT SERPL-MCNC: 6.8 G/DL (ref 6–8.5)
PROT UR QL STRIP: NEGATIVE
RBC # BLD AUTO: 4.08 10*6/MM3 (ref 3.77–5.28)
SODIUM SERPL-SCNC: 137 MMOL/L (ref 136–145)
SP GR UR STRIP: 1.02 (ref 1–1.03)
TROPONIN T SERPL HS-MCNC: 8 NG/L
UROBILINOGEN UR QL STRIP: NORMAL
WBC NRBC COR # BLD: 14.93 10*3/MM3 (ref 3.4–10.8)
WHOLE BLOOD HOLD COAG: NORMAL
WHOLE BLOOD HOLD SPECIMEN: NORMAL

## 2023-03-27 PROCEDURE — 85025 COMPLETE CBC W/AUTO DIFF WBC: CPT

## 2023-03-27 PROCEDURE — 83735 ASSAY OF MAGNESIUM: CPT

## 2023-03-27 PROCEDURE — 99283 EMERGENCY DEPT VISIT LOW MDM: CPT

## 2023-03-27 PROCEDURE — 81003 URINALYSIS AUTO W/O SCOPE: CPT

## 2023-03-27 PROCEDURE — 80053 COMPREHEN METABOLIC PANEL: CPT

## 2023-03-27 PROCEDURE — 25510000001 IOPAMIDOL PER 1 ML: Performed by: EMERGENCY MEDICINE

## 2023-03-27 PROCEDURE — 93005 ELECTROCARDIOGRAM TRACING: CPT

## 2023-03-27 PROCEDURE — 84484 ASSAY OF TROPONIN QUANT: CPT

## 2023-03-27 PROCEDURE — 71275 CT ANGIOGRAPHY CHEST: CPT

## 2023-03-27 PROCEDURE — 36415 COLL VENOUS BLD VENIPUNCTURE: CPT

## 2023-03-27 RX ORDER — SODIUM CHLORIDE 0.9 % (FLUSH) 0.9 %
10 SYRINGE (ML) INJECTION AS NEEDED
Status: DISCONTINUED | OUTPATIENT
Start: 2023-03-27 | End: 2023-03-27 | Stop reason: HOSPADM

## 2023-03-27 RX ADMIN — IOPAMIDOL 75 ML: 755 INJECTION, SOLUTION INTRAVENOUS at 18:02

## 2023-03-27 NOTE — ED PROVIDER NOTES
[facility]    EMERGENCY DEPARTMENT ENCOUNTER      Pt Name: Destiney Green  MRN: [id number]  YOB: 1945  Date of evaluation: 3/27/2023  Provider: Rusty Mccullough DO    CHIEF COMPLAINT       Chief Complaint   Patient presents with   • Abnormal Lab   • Weakness - Generalized         HISTORY OF PRESENT ILLNESS  (Location/Symptom, Timing/Onset, Context/Setting, Quality, Duration, Modifying Factors, Severity.)   Destiney Green is a 77 y.o. female who presents to the emergency department for evaluation of generalized weakness, overall not feeling well for the last few days.  She was diagnosed and treated for COVID few weeks ago, was on Paxlovid, did have a steroid burst at that time.  She does have underlying pulmonary fibrosis and was on 5 mg daily prednisone but the family notes since she was on this 5-day steroid burst that she stopped her daily steroids and then again was restarted few days ago on 10 mg daily.  They note she had some intermittent sweating associate with these episodes, has been generally weak, some loose stools, no nausea or vomiting.  Went to her PCP in addition to her pulmonologist who is out of [facility] and had some blood work, chest x-ray completed was noted to have an elevated D-dimer and instructed to come the hospital for further work-up and assessment.  She does not have any history of heart failure, no peripheral edema.  She denies any fever or chills, no difficulty breathing.  Has not had much of an appetite over the last few days either.  Family notes no fever, no history of underlying blood clotting disorders.  Patient denies any other acute systemic complaints.  Family notes she wears a CPAP therapy in the evenings, nasal cannula oxygen usually 24 hours/day but is not wearing any currently.    Nursing notes were reviewed.      PAST MEDICAL HISTORY     Past Medical History:   Diagnosis Date   • Anxiety     HISTORY OF ANXIETY   • Arthritis    • Cancer (CMS/HCC)      BASAL CELL ON NOSE   • GERD (gastroesophageal reflux disease)    • H/O eczema    • Hypothyroidism    • Low back pain    • Neck pain    • Presence of upper and lower permanent dental bridges    • Pulmonary fibrosis (CMS/HCC)    • Sleep apnea     CPAP COMPLIANT -    • SVT (supraventricular tachycardia) (CMS/HCC)    • TIA (transient ischemic attack)     NO DEFICITS   • Vaso vagal episode     IF CHAIR IS LEANED BACK TOO FAR PATIENT WILL BECOME DIZZY AND VAGAL PER DAUGHTER   • Wears glasses     RX         SURGICAL HISTORY       Past Surgical History:   Procedure Laterality Date   • BASAL CELL CARCINOMA EXCISION     • CARDIAC ELECTROPHYSIOLOGY PROCEDURE N/A 10/26/2020    Procedure: EP/Ablation. SVT;  Surgeon: Jayme Bernard MD;  Location: [facility] EP INVASIVE LOCATION;  Service: Cardiology;  Laterality: N/A;   • CHOLECYSTECTOMY     • COLONOSCOPY      20-30 YEARS AGO         CURRENT MEDICATIONS       Current Facility-Administered Medications:   •  sodium chloride 0.9 % flush 10 mL, 10 mL, Intravenous, PRN, Emergency, Triage Protocol, MD    Current Outpatient Medications:   •  albuterol sulfate  (90 Base) MCG/ACT inhaler, Inhale 2 puffs Every 6 (Six) Hours As Needed for Wheezing., Disp: , Rfl:   •  Ascorbic Acid (Vitamin C) 100 MG chewable tablet, Chew 1,000 mg Daily., Disp: , Rfl:   •  aspirin 81 MG EC tablet, Take 81 mg by mouth Daily., Disp: , Rfl:   •  celecoxib (CeleBREX) 200 MG capsule, Take 200 mg by mouth Daily As Needed., Disp: , Rfl:   •  Cholecalciferol (Vitamin D-3) 25 MCG (1000 UT) capsule, Take 1,000 Units by mouth Daily., Disp: , Rfl:   •  DULoxetine (CYMBALTA) 60 MG capsule, Take 60 mg by mouth Daily., Disp: , Rfl:   •  Fluticasone-Umeclidin-Vilant (Trelegy Ellipta) 100-62.5-25 MCG/INH aerosol powder , Inhale 1 puff Daily., Disp: , Rfl:   •  guaiFENesin (Mucus Relief) 400 MG tablet, Take 400 mg by mouth Every 4 (Four) Hours., Disp: , Rfl:   •  levothyroxine (SYNTHROID, LEVOTHROID) 100 MCG tablet,  "Take 100 mcg by mouth Daily., Disp: , Rfl:   •  pantoprazole (PROTONIX) 40 MG EC tablet, Take 40 mg by mouth 2 (two) times a day., Disp: , Rfl:   •  Pirfenidone (Esbriet) 801 MG tablet, Take  by mouth 3 (Three) Times a Day., Disp: , Rfl:     ALLERGIES     Patient has no known allergies.    FAMILY HISTORY     No family history on file.       SOCIAL HISTORY       Social History     Socioeconomic History   • Marital status:    Tobacco Use   • Smoking status: Former     Years: 10.00     Types: Cigarettes     Quit date: 1990     Years since quittin.5   • Smokeless tobacco: Never   Vaping Use   • Vaping Use: Never used   Substance and Sexual Activity   • Alcohol use: Not Currently   • Drug use: Never   • Sexual activity: Defer         PHYSICAL EXAM    (up to 7 for level 4, 8 or more for level 5)     Vitals:    23 1437   BP: 140/76   BP Location: Left arm   Patient Position: Sitting   Pulse: 80   Resp: 16   Temp: 98.9 °F (37.2 °C)   TempSrc: Oral   SpO2: 98%   Weight: 92.1 kg (203 lb)   Height: 160 cm (63\")       Physical Exam  General : Patient is awake, alert, oriented, in no acute distress, nontoxic appearing, GCS 15  HEENT: Pupils are equally round, EOMI, conjunctivae clear  Neck: Neck is supple, full range of motion, trachea midline  Cardiac: Heart regular rate, rhythm, no murmurs, rubs, or gallops  Lungs: Lungs are clear to auscultation  Abdomen: Abdomen is soft, nontender, nondistended. There are no firm or pulsatile masses, no rebound rigidity or guarding  Musculoskeletal: No peripheral edema, no focal muscle deficits are appreciated  Neuro: Motor intact, sensory intact, level of consciousness is normal  Dermatology: Skin is warm and dry  Psych: Mentation is grossly normal, cognition is grossly normal. Affect is appropriate      DIAGNOSTIC RESULTS     EKG:  All EKGs are interpreted by the Emergency Department Physician who either signs or Co-signs this chart in the absence of a " cardiologist.    ECG 12 Lead ED Triage Standing Order; Weak / Dizzy / AMS   Preliminary Result   Test Reason : ED Triage Standing Order~   Blood Pressure :   */*   mmHG   Vent. Rate :  85 BPM     Atrial Rate :  85 BPM      P-R Int : 142 ms          QRS Dur :  94 ms       QT Int : 392 ms       P-R-T Axes :  35 -22  23 degrees      QTc Int : 466 ms      Sinus rhythm with premature ventricular complexes or fusion complexes   Low voltage QRS   Incomplete right bundle branch block   Inferior infarct (cited on or before 27-MAR-2023)   Abnormal ECG   When compared with ECG of 27-OCT-2020 05:17,   fusion complexes are now present   premature ventricular complexes are now present   Questionable change in QRS duration      Referred By: ED MD           Confirmed By:           RADIOLOGY:     [] Radiologist's Report Reviewed:  CT Angiogram Chest Pulmonary Embolism   Final Result   Impression:   No evidence of pulmonary embolism.      Interstitial lung changes with fibrotic changes and associated traction bronchiectasis with appearance and distribution most compatible with probable UIP pattern. Correlate with clinical history and PFTs.      Electronically Signed: Lee Royal     3/27/2023 6:17 PM EDT     Workstation ID: [id number]          I ordered and independently reviewed the above noted radiographic studies.      I viewed images of CT chest PE study which showed pulmonary fibrosis, no obvious infiltrate/infection present, no large PE per my independent interpretation.    See radiologist's dictation for official interpretation.      ED BEDSIDE ULTRASOUND:   Performed by ED Physician - none    LABS:    I have reviewed and interpreted all of the currently available lab results from this visit (if applicable):  Results for orders placed or performed during the hospital encounter of 03/27/23   Comprehensive Metabolic Panel    Specimen: Blood   Result Value Ref Range    Glucose 136 (H) 65 - 99 mg/dL    BUN 17 8 - 23 mg/dL     Creatinine 0.84 0.57 - 1.00 mg/dL    Sodium 137 136 - 145 mmol/L    Potassium 4.6 3.5 - 5.2 mmol/L    Chloride 102 98 - 107 mmol/L    CO2 25.0 22.0 - 29.0 mmol/L    Calcium 8.7 8.6 - 10.5 mg/dL    Total Protein 6.8 6.0 - 8.5 g/dL    Albumin 3.9 3.5 - 5.2 g/dL    ALT (SGPT) 68 (H) 1 - 33 U/L    AST (SGOT) 31 1 - 32 U/L    Alkaline Phosphatase 101 39 - 117 U/L    Total Bilirubin <0.2 0.0 - 1.2 mg/dL    Globulin 2.9 gm/dL    A/G Ratio 1.3 g/dL    BUN/Creatinine Ratio 20.2 7.0 - 25.0    Anion Gap 10.0 5.0 - 15.0 mmol/L    eGFR 71.7 >60.0 mL/min/1.73   Single High Sensitivity Troponin T    Specimen: Blood   Result Value Ref Range    HS Troponin T 8 <10 ng/L   Magnesium    Specimen: Blood   Result Value Ref Range    Magnesium 2.6 (H) 1.6 - 2.4 mg/dL   Urinalysis With Microscopic If Indicated (No Culture) - Urine, Clean Catch    Specimen: Urine, Clean Catch   Result Value Ref Range    Color, UA Yellow Yellow, Straw    Appearance, UA Clear Clear    pH, UA <=5.0 5.0 - 8.0    Specific Gravity, UA 1.018 1.001 - 1.030    Glucose, UA Negative Negative    Ketones, UA Negative Negative    Bilirubin, UA Negative Negative    Blood, UA Negative Negative    Protein, UA Negative Negative    Leuk Esterase, UA Negative Negative    Nitrite, UA Negative Negative    Urobilinogen, UA 0.2 E.U./dL 0.2 - 1.0 E.U./dL   CBC Auto Differential    Specimen: Blood   Result Value Ref Range    WBC 14.93 (H) 3.40 - 10.80 10*3/mm3    RBC 4.08 3.77 - 5.28 10*6/mm3    Hemoglobin 12.8 12.0 - 15.9 g/dL    Hematocrit 40.1 34.0 - 46.6 %    MCV 98.3 (H) 79.0 - 97.0 fL    MCH 31.4 26.6 - 33.0 pg    MCHC 31.9 31.5 - 35.7 g/dL    RDW 13.3 12.3 - 15.4 %    RDW-SD 47.7 37.0 - 54.0 fl    MPV 8.8 6.0 - 12.0 fL    Platelets 259 140 - 450 10*3/mm3    Neutrophil % 78.5 (H) 42.7 - 76.0 %    Lymphocyte % 8.2 (L) 19.6 - 45.3 %    Monocyte % 6.3 5.0 - 12.0 %    Eosinophil % 2.5 0.3 - 6.2 %    Basophil % 0.3 0.0 - 1.5 %    Immature Grans % 4.2 (H) 0.0 - 0.5 %    Neutrophils,  Absolute 11.71 (H) 1.70 - 7.00 10*3/mm3    Lymphocytes, Absolute 1.22 0.70 - 3.10 10*3/mm3    Monocytes, Absolute 0.94 (H) 0.10 - 0.90 10*3/mm3    Eosinophils, Absolute 0.38 0.00 - 0.40 10*3/mm3    Basophils, Absolute 0.05 0.00 - 0.20 10*3/mm3    Immature Grans, Absolute 0.63 (H) 0.00 - 0.05 10*3/mm3    nRBC 0.0 0.0 - 0.2 /100 WBC   ECG 12 Lead ED Triage Standing Order; Weak / Dizzy / AMS   Result Value Ref Range    QT Interval 392 ms    QTC Interval 466 ms   Green Top (Gel)   Result Value Ref Range    Extra Tube Hold for add-ons.    Lavender Top   Result Value Ref Range    Extra Tube hold for add-on    Gold Top - SST   Result Value Ref Range    Extra Tube Hold for add-ons.    Gray Top   Result Value Ref Range    Extra Tube Hold for add-ons.    Light Blue Top   Result Value Ref Range    Extra Tube Hold for add-ons.         If labs were ordered, I independently reviewed the results and considered them in treating the patient.      EMERGENCY DEPARTMENT COURSE and DIFFERENTIAL DIAGNOSIS/MDM:   Vitals:  AS OF 19:04 EDT    BP - 140/76  HR - 80  TEMP - 98.9 °F (37.2 °C) (Oral)  O2 SATS - 98%      Orders placed during this visit:  Orders Placed This Encounter   Procedures   • CT Angiogram Chest Pulmonary Embolism   • [place] Draw   • Comprehensive Metabolic Panel   • Single High Sensitivity Troponin T   • Magnesium   • Urinalysis With Microscopic If Indicated (No Culture) - Urine, Clean Catch   • CBC Auto Differential   • NPO Diet NPO Type: Strict NPO   • Undress & Gown   • Cardiac Monitoring   • Continuous Pulse Oximetry   • Vital Signs   • Oxygen Therapy- Nasal Cannula; 2 LPM; Titrate for SPO2: 92%, Greater Than or Equal To   • ECG 12 Lead ED Triage Standing Order; Weak / Dizzy / AMS   • Insert Peripheral IV   • Fall Precautions   • CBC & Differential   • Green Top (Gel)   • Lavender Top   • Gold Top - SST   • Gray Top   • Light Blue Top       All labs have been independently reviewed by me.  All radiology studies have  been reviewed by me and the radiologist dictating the report.  All EKG's have been independently viewed and interpreted by me.      Discussion below represents my analysis of pertinent findings related to patient's condition, differential diagnosis, treatment plan and final disposition.    Differential diagnosis:  The differential diagnosis associated with the patient's presentation includes: Post COVID syndrome, pulmonary fibrosis, pulmonary embolism, pneumonia, dehydration, electrolyte abnormality    Additional sources  Discussed/ obtained information from independent historians:   [] Spouse  [] Parent  [x] Family member  [] Friend  [] EMS   [] Other:    External (non-ED) record review:   [] Inpatient record:   [] Office record:   [] Outpatient record:   [] Prior Outpatient labs:   [] Prior Outpatient radiology:   [] Primary Care record:   [] Outside ED record:   [] Other:     Patient's care impacted by:   [] Diabetes  [] Hypertension  [] Hyperlipidemia  [] Coronary Artery Disease   [] COPD   [] Cancer   [] Tobacco Abuse   [] Substance Abuse    [x] Other: Interstitial lung disease    Care significantly affected by Social Determinants of Health (housing and economic circumstances, unemployment)    [] Yes     [x] No   If yes, Patient's care significantly limited by Social Determinants of Health including:   [] Inadequate housing   [] Low income   [] Alcoholism and drug addiction in family   [] Problems related to primary support group   [] Unemployment   [] Problems related to employment   [] Other Social Determinants of Health:       MEDICATIONS ADMINISTERED IN ED:  Medications   sodium chloride 0.9 % flush 10 mL (has no administration in time range)   iopamidol (ISOVUE-370) 76 % injection 75 mL (75 mL Intravenous Given 3/27/23 1802)              This is a pleasant 77-year-old female presents with generalized weakness, fatigue, recently was diagnosed and treated for COVID-19 infection.  She does have underlying  interstitial pulmonary fibrosis, has some outpatient labs which were abnormal with an elevated D-dimer.  She is not hypoxic, her vital signs are stable, she is afebrile with a heart rate of 80 and a pulse ox of 98% on room air.  With her elevated outpatient D-dimer, which is nonspecific we discussed obtaining a CT PE study for further evaluation.  IV, labs, urinalysis obtained.  She does not have any signs of kidney, liver dysfunction, electrodes are stable, white count slightly elevated, likely from her chronic steroid usage.  CT PE study does not reveal any acute intrathoracic process, no pulmonary embolism, no pneumonia, she does have underlying pulmonary interstitial fibrosis present.  I updated the patient and her family on these findings, we discussed continuing with symptomatic treatments, she is on her steroids daily, inhalers, respiratory therapy adjunctive interventions and spirometer, close follow-up with her pulmonary specialist.  Patient was happy with the results of today's work-up and imaging, they will follow closely with her PCP for reevaluation, return to the ED with any worsening symptoms or further concerns.    I had a discussion with the patient/family regarding diagnosis, diagnostic results, treatment plan, and medications.  The patient/family indicated understanding of these instructions.  I spent adequate time at the bedside preceding discharge necessary to personally discuss the aftercare instructions, giving patient education, providing explanations of the results of our evaluations/findings, and my decision making to assure that the patient/family understand the plan of care.  Time was allotted to answer questions at that time and throughout the ED course.  Emphasis was placed on timely follow-up after discharge.  I also discussed the potential for the development of an acute emergent condition requiring further evaluation, admission, or even surgical intervention. I discussed that we  found nothing during the visit today indicating the need for further workup, admission, or the presence of an unstable medical condition.  I encouraged the patient to return to the emergency department immediately for ANY concerns, worsening, new complaints, or if symptoms persist and unable to seek follow-up in a timely fashion.  The patient/family expressed understanding and agreement with this plan.  The patient will follow-up with their PCP in 1-2 days for reevaluation.     PROCEDURES:  Procedures    CRITICAL CARE TIME    Total Critical Care time was 0 minutes, excluding separately reportable procedures.   There was a high probability of clinically significant/life threatening deterioration in the patient's condition which required my urgent intervention.      FINAL IMPRESSION      1. Generalized weakness    2. Post covid-19 condition, unspecified    3. Pulmonary fibrosis (HCC)          DISPOSITION/PLAN     ED Disposition     ED Disposition   Discharge    Condition   Stable    Comment   --             PATIENT REFERRED TO:  Cuate Kramer  [address] [facility] [address] [phone]    In 2 days      [facility] Emergency Department  [address] [facility] [address] [phone]    If symptoms worsen      DISCHARGE MEDICATIONS:     Medication List      CONTINUE taking these medications    albuterol sulfate  (90 Base) MCG/ACT inhaler  Commonly known as: PROVENTIL HFA;VENTOLIN HFA;PROAIR HFA     aspirin 81 MG EC tablet     celecoxib 200 MG capsule  Commonly known as: CeleBREX     DULoxetine 60 MG capsule  Commonly known as: CYMBALTA     Esbriet 801 MG tablet  Generic drug: Pirfenidone     levothyroxine 100 MCG tablet  Commonly known as: SYNTHROID, LEVOTHROID     Mucus Relief 400 MG tablet  Generic drug: guaiFENesin     pantoprazole 40 MG EC tablet  Commonly known as: PROTONIX     Trelegy Ellipta 100-62.5-25 MCG/INH inhaler  Generic drug:  Fluticasone-Umeclidin-Vilant     Vitamin C 100 MG chewable tablet     Vitamin D-3 25 MCG (1000 UT) capsule                Comment: Please note this report has been produced using speech recognition software.      Rusty Mccullough DO  Attending Emergency Physician       Rusty Mccullough DO  03/27/23 7806

## 2023-03-28 ENCOUNTER — TRANSCRIBE ORDERS (OUTPATIENT)
Dept: ADMINISTRATIVE | Facility: HOSPITAL | Age: 78
End: 2023-03-28
Payer: MEDICARE

## 2023-03-28 DIAGNOSIS — R79.1 ABNORMAL COAGULATION PROFILE: Primary | ICD-10-CM

## 2023-03-28 LAB
QT INTERVAL: 392 MS
QTC INTERVAL: 466 MS

## 2023-10-17 NOTE — PROGRESS NOTES
[facility] Cardiology    Encounter Date: 10/20/2023    Patient ID: Destiney Green is a 78 y.o. female.  : 1945     PCP: Cuate Kramer MD       Chief Complaint: Rapid Heart Rate      PROBLEM LIST:  Chest pain  Stress test 9/15/2023: Ef 45%, severe area of ischemia in the lateral wall that is moderate in size  Echo 2023: EF 65-70%, mild MR and TR  SVT  ED presentation 2020 cardioverted with Adenosine 6 mg   Echocardiogram 2020 LVEF 65% mild MR and mild TR.  EPS with RFA AVnRT 10/26/2020  Abnormal CXR/Pulomonary Fibrosis - followed by pulmonology  Reticulonodular interstitial change right >left which could be acute or chornic, consider CT follow up  RBBB  Hypothyroidism  GERD  Loss of consciousness, possible TIA in [place]  - data deficit  Surgical History:  Cholecystectomy  Skin cancer removal  Lung biopsy    History of Present Illness: Destiney Green is a 78 y.o. female who presents to the cardiology office today to be seen in consultation for abnormal stress test. She at previous encounter with our office with Dr. Bernard for SVT ablation and was last seen in .  The patient also has chronic lung disease/pulmonary fibrosis and over the last several months she has noted worsening dyspnea on exertion with frequent episodes of chest pressure.  She saw her primary cardiologist and had subsequent echocardiogram and myocardial perfusion stress test.  Her stress test was high risk abnormal and cardiac attrition study was recommended however she decided to see us for further management.  She also had a CT scan in 2023 showing coronary calcifications.  She has never had a cardiac ablation study in the past.  Her lifestyle is sedentary.  She is denying orthopnea PND palpitations dizziness or syncope.  Has had mild edema which improves with leg elevation.    Past Medical History:   Past Medical History:   Diagnosis Date    Anxiety     HISTORY OF ANXIETY     Arthritis     Cancer     BASAL CELL ON NOSE    GERD (gastroesophageal reflux disease)     H/O eczema     Hypothyroidism     Low back pain     Neck pain     Presence of upper and lower permanent dental bridges     Pulmonary fibrosis     Sleep apnea     CPAP COMPLIANT -     SVT (supraventricular tachycardia)     TIA (transient ischemic attack)     NO DEFICITS    Vaso vagal episode     IF CHAIR IS LEANED BACK TOO FAR PATIENT WILL BECOME DIZZY AND VAGAL PER DAUGHTER    Wears glasses     RX       Past Surgical History:   Past Surgical History:   Procedure Laterality Date    BASAL CELL CARCINOMA EXCISION      CARDIAC ELECTROPHYSIOLOGY PROCEDURE N/A 10/26/2020    Procedure: EP/Ablation. SVT;  Surgeon: Jayme Bernard MD;  Location:  [facility];  Service: Cardiology;  Laterality: N/A;    CHOLECYSTECTOMY      COLONOSCOPY      20-30 YEARS AGO       Family History: History reviewed. No pertinent family history.    Social History:   Social History     Socioeconomic History    Marital status:    Tobacco Use    Smoking status: Former     Years: 10     Types: Cigarettes     Quit date: 1990     Years since quittin.1    Smokeless tobacco: Never   Vaping Use    Vaping Use: Never used   Substance and Sexual Activity    Alcohol use: Not Currently    Drug use: Never    Sexual activity: Defer       Tobacco History:   Social History     Tobacco Use   Smoking Status Former    Years: 10    Types: Cigarettes    Quit date: 1990    Years since quittin.1   Smokeless Tobacco Never       Medications:     Current Outpatient Medications:     albuterol sulfate  (90 Base) MCG/ACT inhaler, Inhale 2 puffs Every 6 (Six) Hours As Needed for Wheezing., Disp: , Rfl:     aspirin 81 MG EC tablet, Take 1 tablet by mouth Daily., Disp: , Rfl:     DULoxetine (CYMBALTA) 60 MG capsule, Take 30 mg by mouth Daily., Disp: , Rfl:     fesoterodine fumarate (TOVIAZ ER) 8 MG tablet sustained-release 24 hour tablet, Take  "1 tablet by mouth Daily., Disp: , Rfl:     Fluticasone-Umeclidin-Vilant (Trelegy Ellipta) 100-62.5-25 MCG/INH aerosol powder , Inhale 1 puff Daily., Disp: , Rfl:     guaiFENesin (Mucus Relief) 400 MG tablet, Take 1 tablet by mouth Every 4 (Four) Hours., Disp: , Rfl:     levothyroxine (SYNTHROID, LEVOTHROID) 100 MCG tablet, Take 1 tablet by mouth Daily., Disp: , Rfl:     pantoprazole (PROTONIX) 40 MG EC tablet, Take 1 tablet by mouth 2 (two) times a day., Disp: , Rfl:     Pirfenidone (Esbriet) 801 MG tablet, Take  by mouth 3 (Three) Times a Day., Disp: , Rfl:     predniSONE (DELTASONE) 5 MG tablet, Take 1 tablet by mouth Daily., Disp: , Rfl:     Allergies:   Allergies   Allergen Reactions    Oxycodone Other (See Comments)     The following portions of the patient's history were reviewed and updated as appropriate: allergies, current medications, past family history, past medical history, past social history, past surgical history and problem list.    Review of Systems   12 point ROS negative except for that listed in the HPI.         Objective:     /60 (BP Location: Right arm, Patient Position: Sitting, Cuff Size: Adult)   Pulse 89   Ht 154.9 cm (61\")   Wt 93.4 kg (206 lb)   SpO2 93%   BMI 38.92 kg/m²    Recheck 124/62    Physical Exam  Constitutional: Patient appears well-developed and well-nourished.   HENT: HEENT exam unremarkable.   Neck: Neck supple. No JVD present. No carotid bruits.   Cardiovascular: Normal rate, regular rhythm and normal heart sounds. No murmur heard.   2+ symmetric pulses.   Pulmonary/Chest: Bilateral rhonchi, good breath sounds.  Abdominal: Abdomen benign.   Musculoskeletal: Does not exhibit edema.   Neurological: Neurological exam unremarkable.   Vitals reviewed.    Data Review:   Lab Results   Component Value Date    GLUCOSE 136 (H) 03/27/2023    BUN 17 03/27/2023    CREATININE 0.84 03/27/2023    EGFRIFNONA 68 10/23/2020    EGFRIFAFRI 82 10/23/2020    BCR 20.2 03/27/2023    K " "4.6 03/27/2023    CO2 25.0 03/27/2023    CALCIUM 8.7 03/27/2023    ALBUMIN 3.9 03/27/2023    AST 31 03/27/2023    ALT 68 (H) 03/27/2023     No results found for: \"CHOL\", \"CHLPL\", \"TRIG\", \"HDL\", \"LDL\", \"LDLDIRECT\"   Lab Results   Component Value Date    WBC 14.93 (H) 03/27/2023    RBC 4.08 03/27/2023    HGB 12.8 03/27/2023    HCT 40.1 03/27/2023    MCV 98.3 (H) 03/27/2023     03/27/2023     No results found for: \"TSH\"  Lab Results   Component Value Date    HGBA1C 5.70 (H) 10/23/2020          ECG 12 Lead    Date/Time: 10/20/2023 1:00 PM  Performed by: Anitha De Leon PA-C    Authorized by: Anitha De Leon PA-C  Comparison: not compared with previous ECG   Previous ECG: no previous ECG available  Rhythm: sinus rhythm  Rate: normal  BPM: 89  Conduction: right bundle branch block    Clinical impression: abnormal EKG  Comments: Inferior and posterior infarct, age undetermined                    Assessment:      Diagnosis Plan   1. Coronary artery disease of native artery of native heart with stable angina pectoris        2. Abnormal nuclear stress test  Case Request Cath Lab: Left Heart Cath    ECG 12 Lead          Plan:   Patient with multiple cardiac risk factors and CAD based on abnormal EKG, coronary calcium on CT scan and high risk positive myocardial perfusion study.    We have recommended cardiac regimen study for further evaluation.    The procedure was explained to the patient/family extensively. Indications, benefits, risks and alternatives were discussed. The patient understands well, and wishes to proceed.  Further recommendations to follow.  We will check a lipid profile at the time of the procedure.  Continue current medications.   FU after procedure sooner as needed.  Thank you for allowing us to participate in the care of your patient.       Scribed for Maddie Us MD by Anitha De Leon PA-C. 10/20/2023  13:44 EDT    I, Maddie Us MD, personally performed the services described in this " documentation as scribed by the above named individual in my presence, and it is both accurate and complete.  10/20/2023  13:46 EDT    Part of this note may be an electronic transcription/translation of spoken language to printed text using the Dragon Dictation System.           WDL

## 2023-10-17 NOTE — H&P (VIEW-ONLY)
[facility] Cardiology    Encounter Date: 10/20/2023    Patient ID: Destiney Green is a 78 y.o. female.  : 1945     PCP: Cuate Kramer MD       Chief Complaint: Rapid Heart Rate      PROBLEM LIST:  Chest pain  Stress test 9/15/2023: Ef 45%, severe area of ischemia in the lateral wall that is moderate in size  Echo 2023: EF 65-70%, mild MR and TR  SVT  ED presentation 2020 cardioverted with Adenosine 6 mg   Echocardiogram 2020 LVEF 65% mild MR and mild TR.  EPS with RFA AVnRT 10/26/2020  Abnormal CXR/Pulomonary Fibrosis - followed by pulmonology  Reticulonodular interstitial change right >left which could be acute or chornic, consider CT follow up  RBBB  Hypothyroidism  GERD  Loss of consciousness, possible TIA in [place]  - data deficit  Surgical History:  Cholecystectomy  Skin cancer removal  Lung biopsy    History of Present Illness: Destiney Green is a 78 y.o. female who presents to the cardiology office today to be seen in consultation for abnormal stress test. She at previous encounter with our office with Dr. Bernard for SVT ablation and was last seen in .  The patient also has chronic lung disease/pulmonary fibrosis and over the last several months she has noted worsening dyspnea on exertion with frequent episodes of chest pressure.  She saw her primary cardiologist and had subsequent echocardiogram and myocardial perfusion stress test.  Her stress test was high risk abnormal and cardiac attrition study was recommended however she decided to see us for further management.  She also had a CT scan in 2023 showing coronary calcifications.  She has never had a cardiac ablation study in the past.  Her lifestyle is sedentary.  She is denying orthopnea PND palpitations dizziness or syncope.  Has had mild edema which improves with leg elevation.    Past Medical History:   Past Medical History:   Diagnosis Date    Anxiety     HISTORY OF ANXIETY     Arthritis     Cancer     BASAL CELL ON NOSE    GERD (gastroesophageal reflux disease)     H/O eczema     Hypothyroidism     Low back pain     Neck pain     Presence of upper and lower permanent dental bridges     Pulmonary fibrosis     Sleep apnea     CPAP COMPLIANT -     SVT (supraventricular tachycardia)     TIA (transient ischemic attack)     NO DEFICITS    Vaso vagal episode     IF CHAIR IS LEANED BACK TOO FAR PATIENT WILL BECOME DIZZY AND VAGAL PER DAUGHTER    Wears glasses     RX       Past Surgical History:   Past Surgical History:   Procedure Laterality Date    BASAL CELL CARCINOMA EXCISION      CARDIAC ELECTROPHYSIOLOGY PROCEDURE N/A 10/26/2020    Procedure: EP/Ablation. SVT;  Surgeon: Jayme Bernard MD;  Location:  [facility];  Service: Cardiology;  Laterality: N/A;    CHOLECYSTECTOMY      COLONOSCOPY      20-30 YEARS AGO       Family History: History reviewed. No pertinent family history.    Social History:   Social History     Socioeconomic History    Marital status:    Tobacco Use    Smoking status: Former     Years: 10     Types: Cigarettes     Quit date: 1990     Years since quittin.1    Smokeless tobacco: Never   Vaping Use    Vaping Use: Never used   Substance and Sexual Activity    Alcohol use: Not Currently    Drug use: Never    Sexual activity: Defer       Tobacco History:   Social History     Tobacco Use   Smoking Status Former    Years: 10    Types: Cigarettes    Quit date: 1990    Years since quittin.1   Smokeless Tobacco Never       Medications:     Current Outpatient Medications:     albuterol sulfate  (90 Base) MCG/ACT inhaler, Inhale 2 puffs Every 6 (Six) Hours As Needed for Wheezing., Disp: , Rfl:     aspirin 81 MG EC tablet, Take 1 tablet by mouth Daily., Disp: , Rfl:     DULoxetine (CYMBALTA) 60 MG capsule, Take 30 mg by mouth Daily., Disp: , Rfl:     fesoterodine fumarate (TOVIAZ ER) 8 MG tablet sustained-release 24 hour tablet, Take  "1 tablet by mouth Daily., Disp: , Rfl:     Fluticasone-Umeclidin-Vilant (Trelegy Ellipta) 100-62.5-25 MCG/INH aerosol powder , Inhale 1 puff Daily., Disp: , Rfl:     guaiFENesin (Mucus Relief) 400 MG tablet, Take 1 tablet by mouth Every 4 (Four) Hours., Disp: , Rfl:     levothyroxine (SYNTHROID, LEVOTHROID) 100 MCG tablet, Take 1 tablet by mouth Daily., Disp: , Rfl:     pantoprazole (PROTONIX) 40 MG EC tablet, Take 1 tablet by mouth 2 (two) times a day., Disp: , Rfl:     Pirfenidone (Esbriet) 801 MG tablet, Take  by mouth 3 (Three) Times a Day., Disp: , Rfl:     predniSONE (DELTASONE) 5 MG tablet, Take 1 tablet by mouth Daily., Disp: , Rfl:     Allergies:   Allergies   Allergen Reactions    Oxycodone Other (See Comments)     The following portions of the patient's history were reviewed and updated as appropriate: allergies, current medications, past family history, past medical history, past social history, past surgical history and problem list.    Review of Systems   12 point ROS negative except for that listed in the HPI.         Objective:     /60 (BP Location: Right arm, Patient Position: Sitting, Cuff Size: Adult)   Pulse 89   Ht 154.9 cm (61\")   Wt 93.4 kg (206 lb)   SpO2 93%   BMI 38.92 kg/m²    Recheck 124/62    Physical Exam  Constitutional: Patient appears well-developed and well-nourished.   HENT: HEENT exam unremarkable.   Neck: Neck supple. No JVD present. No carotid bruits.   Cardiovascular: Normal rate, regular rhythm and normal heart sounds. No murmur heard.   2+ symmetric pulses.   Pulmonary/Chest: Bilateral rhonchi, good breath sounds.  Abdominal: Abdomen benign.   Musculoskeletal: Does not exhibit edema.   Neurological: Neurological exam unremarkable.   Vitals reviewed.    Data Review:   Lab Results   Component Value Date    GLUCOSE 136 (H) 03/27/2023    BUN 17 03/27/2023    CREATININE 0.84 03/27/2023    EGFRIFNONA 68 10/23/2020    EGFRIFAFRI 82 10/23/2020    BCR 20.2 03/27/2023    K " "4.6 03/27/2023    CO2 25.0 03/27/2023    CALCIUM 8.7 03/27/2023    ALBUMIN 3.9 03/27/2023    AST 31 03/27/2023    ALT 68 (H) 03/27/2023     No results found for: \"CHOL\", \"CHLPL\", \"TRIG\", \"HDL\", \"LDL\", \"LDLDIRECT\"   Lab Results   Component Value Date    WBC 14.93 (H) 03/27/2023    RBC 4.08 03/27/2023    HGB 12.8 03/27/2023    HCT 40.1 03/27/2023    MCV 98.3 (H) 03/27/2023     03/27/2023     No results found for: \"TSH\"  Lab Results   Component Value Date    HGBA1C 5.70 (H) 10/23/2020          ECG 12 Lead    Date/Time: 10/20/2023 1:00 PM  Performed by: Anitha De Leon PA-C    Authorized by: Anitha De Leon PA-C  Comparison: not compared with previous ECG   Previous ECG: no previous ECG available  Rhythm: sinus rhythm  Rate: normal  BPM: 89  Conduction: right bundle branch block    Clinical impression: abnormal EKG  Comments: Inferior and posterior infarct, age undetermined                    Assessment:      Diagnosis Plan   1. Coronary artery disease of native artery of native heart with stable angina pectoris        2. Abnormal nuclear stress test  Case Request Cath Lab: Left Heart Cath    ECG 12 Lead          Plan:   Patient with multiple cardiac risk factors and CAD based on abnormal EKG, coronary calcium on CT scan and high risk positive myocardial perfusion study.    We have recommended cardiac regimen study for further evaluation.    The procedure was explained to the patient/family extensively. Indications, benefits, risks and alternatives were discussed. The patient understands well, and wishes to proceed.  Further recommendations to follow.  We will check a lipid profile at the time of the procedure.  Continue current medications.   FU after procedure sooner as needed.  Thank you for allowing us to participate in the care of your patient.       Scribed for Maddie Us MD by Anitha De Leon PA-C. 10/20/2023  13:44 EDT    I, Maddie Us MD, personally performed the services described in this " documentation as scribed by the above named individual in my presence, and it is both accurate and complete.  10/20/2023  13:46 EDT    Part of this note may be an electronic transcription/translation of spoken language to printed text using the Dragon Dictation System.

## 2023-10-20 ENCOUNTER — OFFICE VISIT (OUTPATIENT)
Dept: CARDIOLOGY | Facility: CLINIC | Age: 78
End: 2023-10-20
Payer: MEDICARE

## 2023-10-20 VITALS
HEART RATE: 89 BPM | WEIGHT: 206 LBS | DIASTOLIC BLOOD PRESSURE: 60 MMHG | SYSTOLIC BLOOD PRESSURE: 130 MMHG | BODY MASS INDEX: 38.89 KG/M2 | OXYGEN SATURATION: 93 % | HEIGHT: 61 IN

## 2023-10-20 DIAGNOSIS — R94.39 ABNORMAL NUCLEAR STRESS TEST: Primary | ICD-10-CM

## 2023-10-20 DIAGNOSIS — Z00.00 HEALTH CARE MAINTENANCE: ICD-10-CM

## 2023-10-20 DIAGNOSIS — R94.39 ABNORMAL NUCLEAR STRESS TEST: ICD-10-CM

## 2023-10-20 DIAGNOSIS — I25.118 CORONARY ARTERY DISEASE OF NATIVE ARTERY OF NATIVE HEART WITH STABLE ANGINA PECTORIS: Primary | ICD-10-CM

## 2023-10-20 PROBLEM — I20.89 STABLE ANGINA PECTORIS: Status: ACTIVE | Noted: 2023-10-20

## 2023-10-20 RX ORDER — FESOTERODINE FUMARATE 8 MG/1
8 TABLET, EXTENDED RELEASE ORAL
COMMUNITY
Start: 2023-08-15

## 2023-10-20 RX ORDER — SODIUM CHLORIDE 9 MG/ML
40 INJECTION, SOLUTION INTRAVENOUS AS NEEDED
Status: CANCELLED | OUTPATIENT
Start: 2023-10-20

## 2023-10-20 RX ORDER — SODIUM CHLORIDE 0.9 % (FLUSH) 0.9 %
10 SYRINGE (ML) INJECTION EVERY 12 HOURS SCHEDULED
Status: CANCELLED | OUTPATIENT
Start: 2023-10-20

## 2023-10-20 RX ORDER — PREDNISONE 5 MG/1
5 TABLET ORAL DAILY
COMMUNITY
Start: 2023-09-15

## 2023-10-20 RX ORDER — ASPIRIN 81 MG/1
324 TABLET, CHEWABLE ORAL ONCE
Status: CANCELLED | OUTPATIENT
Start: 2023-10-20 | End: 2023-10-20

## 2023-10-20 RX ORDER — SODIUM CHLORIDE 0.9 % (FLUSH) 0.9 %
10 SYRINGE (ML) INJECTION AS NEEDED
Status: CANCELLED | OUTPATIENT
Start: 2023-10-20

## 2023-10-20 RX ORDER — ASPIRIN 81 MG/1
81 TABLET ORAL DAILY
Status: CANCELLED | OUTPATIENT
Start: 2023-10-21

## 2023-10-23 ENCOUNTER — HOSPITAL ENCOUNTER (OUTPATIENT)
Facility: HOSPITAL | Age: 78
Setting detail: HOSPITAL OUTPATIENT SURGERY
Discharge: HOME | End: 2023-10-23
Attending: INTERNAL MEDICINE | Admitting: INTERNAL MEDICINE
Payer: MEDICARE

## 2023-10-23 VITALS
WEIGHT: 205.8 LBS | DIASTOLIC BLOOD PRESSURE: 50 MMHG | BODY MASS INDEX: 38.86 KG/M2 | SYSTOLIC BLOOD PRESSURE: 93 MMHG | RESPIRATION RATE: 13 BRPM | HEART RATE: 70 BPM | TEMPERATURE: 97.3 F | OXYGEN SATURATION: 99 % | HEIGHT: 61 IN

## 2023-10-23 DIAGNOSIS — Z00.00 HEALTH CARE MAINTENANCE: ICD-10-CM

## 2023-10-23 DIAGNOSIS — R94.39 ABNORMAL NUCLEAR STRESS TEST: ICD-10-CM

## 2023-10-23 DIAGNOSIS — I20.89 STABLE ANGINA PECTORIS: ICD-10-CM

## 2023-10-23 DIAGNOSIS — I47.10 SUSTAINED SVT: ICD-10-CM

## 2023-10-23 LAB
ALBUMIN SERPL-MCNC: 4 G/DL (ref 3.5–5.2)
ALBUMIN/GLOB SERPL: 1.5 G/DL
ALP SERPL-CCNC: 89 U/L (ref 39–117)
ALT SERPL W P-5'-P-CCNC: 23 U/L (ref 1–33)
ANION GAP SERPL CALCULATED.3IONS-SCNC: 8 MMOL/L (ref 5–15)
AST SERPL-CCNC: 25 U/L (ref 1–32)
BASOPHILS # BLD AUTO: 0.07 10*3/MM3 (ref 0–0.2)
BASOPHILS NFR BLD AUTO: 1 % (ref 0–1.5)
BILIRUB SERPL-MCNC: 0.2 MG/DL (ref 0–1.2)
BUN BLDA-MCNC: 9 MG/DL (ref 8–26)
BUN SERPL-MCNC: 10 MG/DL (ref 8–23)
BUN/CREAT SERPL: 14.5 (ref 7–25)
CA-I BLDA-SCNC: 1.2 MMOL/L (ref 1.2–1.32)
CALCIUM SPEC-SCNC: 8.8 MG/DL (ref 8.6–10.5)
CHLORIDE BLDA-SCNC: 102 MMOL/L (ref 98–109)
CHLORIDE SERPL-SCNC: 104 MMOL/L (ref 98–107)
CHOLEST SERPL-MCNC: 188 MG/DL (ref 0–200)
CO2 BLDA-SCNC: 29 MMOL/L (ref 24–29)
CO2 SERPL-SCNC: 29 MMOL/L (ref 22–29)
CREAT BLDA-MCNC: 0.6 MG/DL (ref 0.6–1.3)
CREAT SERPL-MCNC: 0.69 MG/DL (ref 0.57–1)
DEPRECATED RDW RBC AUTO: 50.3 FL (ref 37–54)
EGFRCR SERPLBLD CKD-EPI 2021: 89 ML/MIN/1.73
EGFRCR SERPLBLD CKD-EPI 2021: 92 ML/MIN/1.73
EOSINOPHIL # BLD AUTO: 0.69 10*3/MM3 (ref 0–0.4)
EOSINOPHIL NFR BLD AUTO: 10 % (ref 0.3–6.2)
ERYTHROCYTE [DISTWIDTH] IN BLOOD BY AUTOMATED COUNT: 13.9 % (ref 12.3–15.4)
GLOBULIN UR ELPH-MCNC: 2.7 GM/DL
GLUCOSE BLDC GLUCOMTR-MCNC: 90 MG/DL (ref 70–130)
GLUCOSE SERPL-MCNC: 91 MG/DL (ref 65–99)
HBA1C MFR BLD: 5.5 % (ref 4.8–5.6)
HCT VFR BLD AUTO: 36.9 % (ref 34–46.6)
HCT VFR BLDA CALC: 33 % (ref 38–51)
HDLC SERPL-MCNC: 52 MG/DL (ref 40–60)
HGB BLD-MCNC: 11.7 G/DL (ref 12–15.9)
HGB BLDA-MCNC: 11.2 G/DL (ref 12–17)
IMM GRANULOCYTES # BLD AUTO: 0.02 10*3/MM3 (ref 0–0.05)
IMM GRANULOCYTES NFR BLD AUTO: 0.3 % (ref 0–0.5)
LDLC SERPL CALC-MCNC: 113 MG/DL (ref 0–100)
LDLC/HDLC SERPL: 2.11 {RATIO}
LYMPHOCYTES # BLD AUTO: 1.86 10*3/MM3 (ref 0.7–3.1)
LYMPHOCYTES NFR BLD AUTO: 26.8 % (ref 19.6–45.3)
MCH RBC QN AUTO: 31.4 PG (ref 26.6–33)
MCHC RBC AUTO-ENTMCNC: 31.7 G/DL (ref 31.5–35.7)
MCV RBC AUTO: 98.9 FL (ref 79–97)
MONOCYTES # BLD AUTO: 0.92 10*3/MM3 (ref 0.1–0.9)
MONOCYTES NFR BLD AUTO: 13.3 % (ref 5–12)
NEUTROPHILS NFR BLD AUTO: 3.37 10*3/MM3 (ref 1.7–7)
NEUTROPHILS NFR BLD AUTO: 48.6 % (ref 42.7–76)
NRBC BLD AUTO-RTO: 0 /100 WBC (ref 0–0.2)
PLATELET # BLD AUTO: 285 10*3/MM3 (ref 140–450)
PMV BLD AUTO: 9.5 FL (ref 6–12)
POTASSIUM BLDA-SCNC: 3.8 MMOL/L (ref 3.5–4.9)
POTASSIUM SERPL-SCNC: 4 MMOL/L (ref 3.5–5.2)
PROT SERPL-MCNC: 6.7 G/DL (ref 6–8.5)
RBC # BLD AUTO: 3.73 10*6/MM3 (ref 3.77–5.28)
SODIUM BLD-SCNC: 141 MMOL/L (ref 138–146)
SODIUM SERPL-SCNC: 141 MMOL/L (ref 136–145)
TRIGL SERPL-MCNC: 131 MG/DL (ref 0–150)
VLDLC SERPL-MCNC: 23 MG/DL (ref 5–40)
WBC NRBC COR # BLD: 6.93 10*3/MM3 (ref 3.4–10.8)

## 2023-10-23 PROCEDURE — 80053 COMPREHEN METABOLIC PANEL: CPT

## 2023-10-23 PROCEDURE — 25810000003 SODIUM CHLORIDE 0.9 % SOLUTION

## 2023-10-23 PROCEDURE — 25010000002 FENTANYL CITRATE (PF) 50 MCG/ML SOLUTION: Performed by: INTERNAL MEDICINE

## 2023-10-23 PROCEDURE — C1894 INTRO/SHEATH, NON-LASER: HCPCS | Performed by: INTERNAL MEDICINE

## 2023-10-23 PROCEDURE — C1760 CLOSURE DEV, VASC: HCPCS | Performed by: INTERNAL MEDICINE

## 2023-10-23 PROCEDURE — 93458 L HRT ARTERY/VENTRICLE ANGIO: CPT | Performed by: INTERNAL MEDICINE

## 2023-10-23 PROCEDURE — 25810000003 SODIUM CHLORIDE 0.9 % SOLUTION: Performed by: INTERNAL MEDICINE

## 2023-10-23 PROCEDURE — 25510000001 IOPAMIDOL 41 % SOLUTION: Performed by: INTERNAL MEDICINE

## 2023-10-23 PROCEDURE — 25510000001 IOPAMIDOL PER 1 ML: Performed by: INTERNAL MEDICINE

## 2023-10-23 PROCEDURE — 83036 HEMOGLOBIN GLYCOSYLATED A1C: CPT

## 2023-10-23 PROCEDURE — 80061 LIPID PANEL: CPT

## 2023-10-23 PROCEDURE — 80047 BASIC METABLC PNL IONIZED CA: CPT

## 2023-10-23 PROCEDURE — 85014 HEMATOCRIT: CPT

## 2023-10-23 PROCEDURE — C1769 GUIDE WIRE: HCPCS | Performed by: INTERNAL MEDICINE

## 2023-10-23 PROCEDURE — 25010000002 LIDOCAINE 1 % SOLUTION: Performed by: INTERNAL MEDICINE

## 2023-10-23 PROCEDURE — 25010000002 MIDAZOLAM PER 1 MG: Performed by: INTERNAL MEDICINE

## 2023-10-23 PROCEDURE — 85025 COMPLETE CBC W/AUTO DIFF WBC: CPT

## 2023-10-23 RX ORDER — ACETAMINOPHEN 325 MG/1
650 TABLET ORAL EVERY 4 HOURS PRN
Status: DISCONTINUED | OUTPATIENT
Start: 2023-10-23 | End: 2023-10-23 | Stop reason: HOSPADM

## 2023-10-23 RX ORDER — HEPARIN SODIUM 1000 [USP'U]/ML
INJECTION, SOLUTION INTRAVENOUS; SUBCUTANEOUS
Status: DISCONTINUED | OUTPATIENT
Start: 2023-10-23 | End: 2023-10-23 | Stop reason: HOSPADM

## 2023-10-23 RX ORDER — ASPIRIN 81 MG/1
81 TABLET ORAL DAILY
Status: DISCONTINUED | OUTPATIENT
Start: 2023-10-24 | End: 2023-10-23 | Stop reason: HOSPADM

## 2023-10-23 RX ORDER — ASPIRIN 81 MG/1
324 TABLET, CHEWABLE ORAL ONCE
Status: COMPLETED | OUTPATIENT
Start: 2023-10-23 | End: 2023-10-23

## 2023-10-23 RX ORDER — FUROSEMIDE 20 MG/1
20 TABLET ORAL DAILY
Qty: 30 TABLET | Refills: 1 | Status: SHIPPED | OUTPATIENT
Start: 2023-10-23

## 2023-10-23 RX ORDER — FENTANYL CITRATE 50 UG/ML
INJECTION, SOLUTION INTRAMUSCULAR; INTRAVENOUS
Status: DISCONTINUED | OUTPATIENT
Start: 2023-10-23 | End: 2023-10-23 | Stop reason: HOSPADM

## 2023-10-23 RX ORDER — GUAIFENESIN 600 MG/1
1200 TABLET, EXTENDED RELEASE ORAL EVERY 12 HOURS PRN
COMMUNITY

## 2023-10-23 RX ORDER — DULOXETIN HYDROCHLORIDE 30 MG/1
30 CAPSULE, DELAYED RELEASE ORAL DAILY
COMMUNITY

## 2023-10-23 RX ORDER — LIDOCAINE HYDROCHLORIDE 10 MG/ML
INJECTION, SOLUTION INFILTRATION; PERINEURAL
Status: DISCONTINUED | OUTPATIENT
Start: 2023-10-23 | End: 2023-10-23 | Stop reason: HOSPADM

## 2023-10-23 RX ORDER — SODIUM CHLORIDE 0.9 % (FLUSH) 0.9 %
10 SYRINGE (ML) INJECTION AS NEEDED
Status: DISCONTINUED | OUTPATIENT
Start: 2023-10-23 | End: 2023-10-23 | Stop reason: HOSPADM

## 2023-10-23 RX ORDER — SODIUM CHLORIDE 9 MG/ML
40 INJECTION, SOLUTION INTRAVENOUS AS NEEDED
Status: DISCONTINUED | OUTPATIENT
Start: 2023-10-23 | End: 2023-10-23 | Stop reason: HOSPADM

## 2023-10-23 RX ORDER — SODIUM CHLORIDE 0.9 % (FLUSH) 0.9 %
10 SYRINGE (ML) INJECTION EVERY 12 HOURS SCHEDULED
Status: DISCONTINUED | OUTPATIENT
Start: 2023-10-23 | End: 2023-10-23 | Stop reason: HOSPADM

## 2023-10-23 RX ORDER — NITROGLYCERIN 0.4 MG/1
0.4 TABLET SUBLINGUAL
Status: DISCONTINUED | OUTPATIENT
Start: 2023-10-23 | End: 2023-10-23 | Stop reason: HOSPADM

## 2023-10-23 RX ORDER — IOPAMIDOL 408 MG/ML
INJECTION, SOLUTION INTRATHECAL
Status: DISCONTINUED | OUTPATIENT
Start: 2023-10-23 | End: 2023-10-23 | Stop reason: HOSPADM

## 2023-10-23 RX ORDER — NICARDIPINE HCL-0.9% SOD CHLOR 1 MG/10 ML
SYRINGE (ML) INTRAVENOUS
Status: DISCONTINUED | OUTPATIENT
Start: 2023-10-23 | End: 2023-10-23 | Stop reason: HOSPADM

## 2023-10-23 RX ORDER — SODIUM CHLORIDE 9 MG/ML
100 INJECTION, SOLUTION INTRAVENOUS CONTINUOUS
Status: DISCONTINUED | OUTPATIENT
Start: 2023-10-23 | End: 2023-10-23 | Stop reason: HOSPADM

## 2023-10-23 RX ORDER — METOPROLOL SUCCINATE 25 MG/1
25 TABLET, EXTENDED RELEASE ORAL DAILY
Qty: 90 TABLET | Refills: 3 | Status: SHIPPED | OUTPATIENT
Start: 2023-10-23

## 2023-10-23 RX ORDER — MIDAZOLAM HYDROCHLORIDE 1 MG/ML
INJECTION INTRAMUSCULAR; INTRAVENOUS
Status: DISCONTINUED | OUTPATIENT
Start: 2023-10-23 | End: 2023-10-23 | Stop reason: HOSPADM

## 2023-10-23 RX ADMIN — SODIUM CHLORIDE 280.2 ML: 9 INJECTION, SOLUTION INTRAVENOUS at 08:08

## 2023-10-23 RX ADMIN — ASPIRIN 81 MG CHEWABLE TABLET 324 MG: 81 TABLET CHEWABLE at 08:05

## 2023-10-23 NOTE — INTERVAL H&P NOTE
H&P reviewed. The patient was examined and there are no changes to the H&P.      Patient presents today for [facility]. Risks, benefits, and alternatives discussed with the patient and daughter and they wish to proceed. We will proceed via right radial.

## 2023-10-24 ENCOUNTER — DOCUMENTATION (OUTPATIENT)
Dept: CARDIAC REHAB | Facility: HOSPITAL | Age: 78
End: 2023-10-24
Payer: MEDICARE

## 2023-10-24 NOTE — PROGRESS NOTES
Referral received for Phase II Cardiac Rehab.  Staff has reviewed chart and patient does not have a qualifying diagnosis for Phase II Cardiac Rehab at this time.  Staff available if further consultation is needed..

## 2024-01-26 ENCOUNTER — OFFICE VISIT (OUTPATIENT)
Dept: CARDIOLOGY | Facility: CLINIC | Age: 79
End: 2024-01-26
Payer: MEDICARE

## 2024-01-26 VITALS
WEIGHT: 209 LBS | SYSTOLIC BLOOD PRESSURE: 100 MMHG | BODY MASS INDEX: 39.46 KG/M2 | HEART RATE: 75 BPM | DIASTOLIC BLOOD PRESSURE: 58 MMHG | HEIGHT: 61 IN | OXYGEN SATURATION: 94 %

## 2024-01-26 DIAGNOSIS — I20.89 STABLE ANGINA PECTORIS: Primary | ICD-10-CM

## 2024-01-26 DIAGNOSIS — J84.10 PULMONARY FIBROSIS: ICD-10-CM

## 2024-01-26 DIAGNOSIS — R94.39 ABNORMAL NUCLEAR STRESS TEST: ICD-10-CM

## 2024-01-26 DIAGNOSIS — I47.10 PAROXYSMAL SVT (SUPRAVENTRICULAR TACHYCARDIA): ICD-10-CM

## 2024-01-26 RX ORDER — IPRATROPIUM BROMIDE 42 UG/1
SPRAY, METERED NASAL
COMMUNITY

## 2024-01-26 RX ORDER — IVERMECTIN 3 MG/1
3 TABLET ORAL
COMMUNITY
End: 2024-01-26

## 2024-01-26 NOTE — PROGRESS NOTES
[facility] Cardiology    Encounter Date: 2024    Patient ID: Destiney Green is a 78 y.o. female.  : 1945     PCP: Cuate Kramer MD       Chief Complaint: Coronary Artery Disease      PROBLEM LIST:  Chest pain  Stress test 9/15/2023: Ef 45%, severe area of ischemia in the lateral wall that is moderate in size  Echo 2023: EF 65-70%, mild MR and TR  [facility], 10/23/2023: EF 65%. Angiographically normal coronary arteries. Arterial hypertension and left ventricular diastolic dysfunction.   SVT  ED presentation 2020 cardioverted with Adenosine 6 mg   Echocardiogram 2020 LVEF 65% mild MR and mild TR.  EPS with RFA AVnRT 10/26/2020  Abnormal CXR/Pulomonary Fibrosis - followed by pulmonology  Reticulonodular interstitial change right >left which could be acute or chornic, consider CT follow up  RBBB  Hypothyroidism  GERD  Loss of consciousness, possible TIA in [place]  - data deficit  Surgical History:  Cholecystectomy  Skin cancer removal  Lung biopsy    History of Present Illness  Patient presents today for a follow-up s/p [facility] with a history of chest pain/dyspnea/cardiac arrhythmias.  The patient states that since her cardiac cath procedure she has been doing well overall from a cardiovascular standpoint.  Her problem remains dyspnea on exertion which is attributed to her pulmonary fibrosis.  She is followed by pulmonologist Dr. Crowder and remains on oxygen.  States that yesterday after working excessively while fixing things around in her garage she became excessively fatigued and short of breath and felt some tightness in her chest.    Patient states she has gained some weight. She notes occasional swelling in her ankles and hands. Patient is on supplemental oxygen most of the day except when sitting down and relaxing. She denies current chest pain or shortness of breath at rest.  Denies orthopnea PND palpitations dizziness or syncope.     Allergies    Allergen Reactions    Oxycodone Hives         Current Outpatient Medications:     albuterol sulfate  (90 Base) MCG/ACT inhaler, Inhale 2 puffs Every 6 (Six) Hours As Needed for Wheezing., Disp: , Rfl:     aspirin 81 MG EC tablet, Take 1 tablet by mouth Daily., Disp: , Rfl:     DULoxetine (CYMBALTA) 30 MG capsule, Take 1 capsule by mouth Daily., Disp: , Rfl:     fesoterodine fumarate (TOVIAZ ER) 8 MG tablet sustained-release 24 hour tablet, Take 1 tablet by mouth Daily., Disp: , Rfl:     Fluticasone-Umeclidin-Vilant (Trelegy Ellipta) 100-62.5-25 MCG/INH aerosol powder , Inhale 1 puff Daily., Disp: , Rfl:     furosemide (LASIX) 20 MG tablet, Take 1 tablet by mouth Daily., Disp: 30 tablet, Rfl: 1    guaiFENesin (MUCINEX) 600 MG 12 hr tablet, Take 2 tablets by mouth Every 12 (Twelve) Hours As Needed for Cough., Disp: , Rfl:     ipratropium (ATROVENT) 0.06 % nasal spray, USE 1-2 SPRAYS NASALLY THREE TIMES DAILY AS NEEDED, Disp: , Rfl:     ketotifen (ZADITOR) 0.025 % ophthalmic solution, Administer 1 drop to both eyes Daily., Disp: , Rfl:     levothyroxine (SYNTHROID, LEVOTHROID) 100 MCG tablet, Take 1 tablet by mouth Daily., Disp: , Rfl:     metoprolol succinate XL (TOPROL-XL) 25 MG 24 hr tablet, Take 1 tablet by mouth Daily., Disp: 90 tablet, Rfl: 3    pantoprazole (PROTONIX) 40 MG EC tablet, Take 1 tablet by mouth 2 (two) times a day., Disp: , Rfl:     Pirfenidone (Esbriet) 801 MG tablet, Take  by mouth 3 (Three) Times a Day., Disp: , Rfl:     predniSONE (DELTASONE) 5 MG tablet, Take 1 tablet by mouth Daily., Disp: , Rfl:     The following portions of the patient's history were reviewed and updated as appropriate: allergies, current medications, past family history, past medical history, past social history, past surgical history and problem list.    ROS  Review of Systems   14 point ROS negative except for that listed in the HPI.         Objective:     /58 (BP Location: Left arm, Patient Position:  "Sitting, Cuff Size: Adult)   Pulse 75   Ht 154.9 cm (60.98\")   Wt 94.8 kg (209 lb)   SpO2 94%   BMI 39.51 kg/m²      Physical Exam  Constitutional: Patient appears well-developed and well-nourished.   HENT: HEENT exam unremarkable.   Neck: Neck supple. No JVD present. No carotid bruits.   Cardiovascular: Normal rate, regular rhythm and normal heart sounds. No murmur heard.   2+ symmetric pulses.   Pulmonary/Chest: Breath sounds normal. Does not exhibit tenderness.   Abdominal: Abdomen benign.   Musculoskeletal: Does not exhibit edema.   Neurological: Neurological exam unremarkable.   Vitals reviewed.    Data Review:   Lab Results   Component Value Date    GLUCOSE 91 10/23/2023    BUN 10 10/23/2023    CREATININE 0.60 10/23/2023    EGFR 92.0 10/23/2023    BCR 14.5 10/23/2023     10/23/2023    K 4.0 10/23/2023    CO2 29.0 10/23/2023    CALCIUM 8.8 10/23/2023    ALBUMIN 4.0 10/23/2023    AST 25 10/23/2023    ALT 23 10/23/2023     Lab Results   Component Value Date    CHOL 188 10/23/2023    TRIG 131 10/23/2023    HDL 52 10/23/2023     (H) 10/23/2023      Lab Results   Component Value Date    WBC 6.93 10/23/2023    RBC 3.73 (L) 10/23/2023    HGB 11.2 (L) 10/23/2023    HCT 33 (L) 10/23/2023    MCV 98.9 (H) 10/23/2023     10/23/2023     Lab Results   Component Value Date    HGBA1C 5.50 10/23/2023          ECG 12 Lead    Date/Time: 1/26/2024 10:18 AM  Performed by: Maddie Us MD    Authorized by: Maddie Us MD  Comparison: compared with previous ECG from 10/20/2023  Similar to previous ECG  Rhythm: sinus rhythm  BPM: 75  Conduction: right bundle branch block  QRS axis: left    Clinical impression: abnormal EKG  Comments: Inferior infarct           Assessment:      Diagnosis Plan   1. Stable angina pectoris  In the setting of pulmonary fibrosis, normal coronary arteries by cardiac cath      2. Abnormal nuclear stress test  Normal coronary arteries by cardiac cath, continue risk factor " management.      3. Paroxysmal SVT (supraventricular tachycardia)  Status post ablation, no recurrence, continue metoprolol.      4. Pulmonary fibrosis  Managed by pulmonology, on oxygen and steroids.            Plan:   Stable cardiac status.  Findings of negative cardiac cath discussed and patient reassured.  Continue risk factor management and follow-up with pulmonology for management of pulmonary fibrosis.  Continue current medications including current dose of metoprolol and Lasix.   FU in 12 MO, sooner as needed.  Thank you for allowing us to participate in the care of your patient.     Scribed for Maddie Us MD by Uzma Gallagher. 1/29/2024 10:17 EST    I, Maddie Us MD, personally performed the services described in this documentation as scribed by the above named individual in my presence, and it is both accurate and complete.  1/29/2024  06:57 EST      Please note that portions of this note may have been completed with a voice recognition program. Efforts were made to edit the dictations, but occasionally words are mistranscribed.

## 2024-01-29 PROBLEM — I47.10 SUSTAINED SVT: Status: RESOLVED | Noted: 2020-09-09 | Resolved: 2024-01-29

## 2024-01-29 PROBLEM — J84.10 PULMONARY FIBROSIS: Status: ACTIVE | Noted: 2024-01-29

## 2024-01-29 PROBLEM — I25.118 CORONARY ARTERY DISEASE OF NATIVE ARTERY OF NATIVE HEART WITH STABLE ANGINA PECTORIS: Status: RESOLVED | Noted: 2023-10-20 | Resolved: 2024-01-29

## 2024-05-09 RX ORDER — METOPROLOL SUCCINATE 25 MG/1
25 TABLET, EXTENDED RELEASE ORAL DAILY
Qty: 30 TABLET | Refills: 1 | Status: SHIPPED | OUTPATIENT
Start: 2024-05-09

## 2024-06-10 RX ORDER — METOPROLOL SUCCINATE 25 MG/1
25 TABLET, EXTENDED RELEASE ORAL DAILY
Qty: 30 TABLET | Refills: 0 | Status: SHIPPED | OUTPATIENT
Start: 2024-06-10

## 2024-07-12 RX ORDER — METOPROLOL SUCCINATE 25 MG/1
25 TABLET, EXTENDED RELEASE ORAL DAILY
Qty: 30 TABLET | Refills: 0 | Status: SHIPPED | OUTPATIENT
Start: 2024-07-12

## 2024-08-08 RX ORDER — METOPROLOL SUCCINATE 25 MG/1
25 TABLET, EXTENDED RELEASE ORAL DAILY
Qty: 30 TABLET | Refills: 0 | Status: SHIPPED | OUTPATIENT
Start: 2024-08-08

## 2024-09-05 RX ORDER — METOPROLOL SUCCINATE 25 MG/1
25 TABLET, EXTENDED RELEASE ORAL DAILY
Qty: 30 TABLET | Refills: 0 | Status: SHIPPED | OUTPATIENT
Start: 2024-09-05

## 2024-10-04 RX ORDER — FUROSEMIDE 20 MG
20 TABLET ORAL DAILY
Qty: 30 TABLET | Refills: 3 | Status: SHIPPED | OUTPATIENT
Start: 2024-10-04

## 2024-10-04 RX ORDER — METOPROLOL SUCCINATE 25 MG/1
25 TABLET, EXTENDED RELEASE ORAL DAILY
Qty: 30 TABLET | Refills: 3 | Status: SHIPPED | OUTPATIENT
Start: 2024-10-04

## 2025-01-27 NOTE — PROGRESS NOTES
[facility]    Encounter Date: 2025    Patient ID: Destiney Green is a 79 y.o. female.  : 1945     PCP: Cuate Kramer MD       Chief Complaint: Stable angina pectoris      PROBLEM LIST:  Chest pain  Stress test 9/15/2023: Ef 45%, severe area of ischemia in the lateral wall that is moderate in size  Echo 2023: EF 65-70%, mild MR and TR  C, 10/23/2023: EF 65%. Angiographically normal coronary arteries. Arterial hypertension and left ventricular diastolic dysfunction.   SVT  ED presentation 2020 cardioverted with Adenosine 6 mg   Echocardiogram 2020 LVEF 65% mild MR and mild TR.  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 Since last visit, patient's cardiac status has remained stable however she reports occasional tightness and fullness in her chest which occurs with activities and exertion.  She has also been experiencing a lot of symptoms from vertigo, daughter reports that patient has had previous Epley maneuvers however his vertigo has gotten worse recently and she is not even able to drive to the local grocery store without becoming very dizzy and without vomiting.  In fact after drive to our office patient vomited in the waiting room and upon arrival to the room she was feeling fatigued.  She has her usual shortness of breath.  Her pulmonologist has recommended an echocardiogram which the family has not been able to schedule at  yet.  No  edema or palpitations.  No syncope    Allergies   Allergen Reactions    Oxycodone Hives         Current Outpatient Medications:     albuterol sulfate  (90 Base) MCG/ACT inhaler, Inhale 2 puffs Every 6 (Six) Hours As Needed for Wheezing., Disp: , Rfl:     aspirin 81 MG EC tablet, Take 1 tablet by mouth Daily., Disp: , Rfl:     DULoxetine (CYMBALTA) 30 MG capsule, Take 1 capsule by mouth Daily., Disp: , Rfl:     fesoterodine fumarate (TOVIAZ ER) 8 MG tablet sustained-release 24 hour tablet, Take 1 tablet by mouth Daily., Disp: , Rfl:     Fluticasone-Umeclidin-Vilant (Trelegy Ellipta) 100-62.5-25 MCG/INH aerosol powder , Inhale 1 puff Daily., Disp: , Rfl:     furosemide (LASIX) 20 MG tablet, TAKE ONE TABLET BY MOUTH DAILY, Disp: 30 tablet, Rfl: 3    guaiFENesin (MUCINEX) 600 MG 12 hr tablet, Take 2 tablets by mouth Every 12 (Twelve) Hours As Needed for Cough., Disp: , Rfl:     ipratropium (ATROVENT) 0.06 % nasal spray, USE 1-2 SPRAYS NASALLY THREE TIMES DAILY AS NEEDED, Disp: , Rfl:     ketotifen (ZADITOR) 0.025 % ophthalmic solution, Administer 1 drop to both eyes Daily., Disp: , Rfl:     levothyroxine (SYNTHROID, LEVOTHROID) 100 MCG tablet, Take 1 tablet by mouth Daily., Disp: , Rfl:     metoprolol succinate XL (TOPROL-XL) 25 MG 24 hr tablet, TAKE ONE TABLET BY MOUTH DAILY, Disp: 30 tablet, Rfl: 3    pantoprazole (PROTONIX) 40 MG EC tablet, Take 1 tablet by mouth 2 (two) times a day., Disp: , Rfl:     Pirfenidone (Esbriet) 801 MG tablet, Take  by mouth 3 (Three) Times a Day., Disp: , Rfl:     predniSONE (DELTASONE) 5 MG tablet, Take 1 tablet by mouth Daily., Disp: , Rfl:     nitroglycerin (NITROSTAT) 0.4 MG SL tablet, 1 under the tongue as needed for angina, may repeat q5mins for up three doses, Disp: 25 tablet, Rfl: 1    The following portions of the patient's history were reviewed and updated as appropriate: allergies, current medications, past family history, past medical history, past social history, past  "surgical history and problem list.    ROS  Review of Systems   14 point ROS negative except for that listed in the HPI.         Objective:     /62 (BP Location: Left arm, Patient Position: Lying, Cuff Size: Adult)   Pulse 71   Ht 154.9 cm (61\")   Wt 88 kg (194 lb) Comment: Patient Reported  SpO2 95%   BMI 36.66 kg/m²      Physical Exam  General: No apparent distress.  On her continuous oxygen.  Neck: no JVD.  Chest:No respiratory distress, breath sounds are equal with scattered rhonchi.  Cardiovascular: Normal S1 and S2, no murmur, gallop or rub.    Extremities: No edema.    Data Review:   No recent laboratory studies available for review today.         Procedures       Advance Care Planning   ACP discussion was held with the patient during this visit. Patient does not have an advance directive, declines further assistance.           Assessment:      Diagnosis Plan   1. Stable angina pectoris, most probably spastic angina or chest pain related to her underlying lung disease.  Her cardiac cath was normal. Adult Transthoracic Echo Complete W/ Cont if Necessary Per Protocol as recommended by her pulmonologist, we will add nitroglycerin as needed for chest pain.  The findings of previous negative catheter discussed and she was reassured      2. Paroxysmal SVT (supraventricular tachycardia)  Inactive/no recent episodes, continue metoprolol      3. Pulmonary fibrosis  Managed by  pulmonology, continue current management.        Plan:   Stable cardiac status.  Regarding her symptoms of vertigo have advised them to follow-up with PCP and consider further evaluation and repeat Epley maneuver.  Patient encouraged to remain active and use nitroglycerin as needed for chest pain.  We will obtain echocardiogram to assess current LV function and valve function and PA pressures.  She will follow-up with PCP and pulmonology for further management of multiple medical conditions  Continue current medications.   FU in 12 " MO, sooner as needed.  Thank you for allowing us to participate in the care of your patient.     Maddie Us MD, FAC, [facility]      Please note that portions of this note may have been completed with a voice recognition program. Efforts were made to edit the dictations, but occasionally words are mistranscribed.

## 2025-01-31 ENCOUNTER — OFFICE VISIT (OUTPATIENT)
Dept: CARDIOLOGY | Facility: CLINIC | Age: 80
End: 2025-01-31
Payer: MEDICARE

## 2025-01-31 VITALS
OXYGEN SATURATION: 95 % | DIASTOLIC BLOOD PRESSURE: 62 MMHG | HEIGHT: 61 IN | HEART RATE: 71 BPM | SYSTOLIC BLOOD PRESSURE: 136 MMHG | BODY MASS INDEX: 36.63 KG/M2 | WEIGHT: 194 LBS

## 2025-01-31 DIAGNOSIS — J84.10 PULMONARY FIBROSIS: ICD-10-CM

## 2025-01-31 DIAGNOSIS — I20.89 STABLE ANGINA PECTORIS: Primary | ICD-10-CM

## 2025-01-31 DIAGNOSIS — I47.10 PAROXYSMAL SVT (SUPRAVENTRICULAR TACHYCARDIA): ICD-10-CM

## 2025-01-31 PROCEDURE — 1159F MED LIST DOCD IN RCRD: CPT | Performed by: INTERNAL MEDICINE

## 2025-01-31 PROCEDURE — 1160F RVW MEDS BY RX/DR IN RCRD: CPT | Performed by: INTERNAL MEDICINE

## 2025-01-31 PROCEDURE — 99214 OFFICE O/P EST MOD 30 MIN: CPT | Performed by: INTERNAL MEDICINE

## 2025-01-31 RX ORDER — NITROGLYCERIN 0.4 MG/1
TABLET SUBLINGUAL
Qty: 25 TABLET | Refills: 1 | Status: SHIPPED | OUTPATIENT
Start: 2025-01-31

## 2025-02-24 RX ORDER — METOPROLOL SUCCINATE 25 MG/1
25 TABLET, EXTENDED RELEASE ORAL DAILY
Qty: 30 TABLET | Refills: 2 | Status: SHIPPED | OUTPATIENT
Start: 2025-02-24

## 2025-03-14 ENCOUNTER — HOSPITAL ENCOUNTER (OUTPATIENT)
Facility: HOSPITAL | Age: 80
Discharge: HOME | End: 2025-03-14
Payer: MEDICARE

## 2025-03-14 VITALS — WEIGHT: 194 LBS | HEIGHT: 61 IN | BODY MASS INDEX: 36.63 KG/M2

## 2025-03-14 DIAGNOSIS — J84.10 PULMONARY FIBROSIS: ICD-10-CM

## 2025-03-14 DIAGNOSIS — I47.10 PAROXYSMAL SVT (SUPRAVENTRICULAR TACHYCARDIA): ICD-10-CM

## 2025-03-14 DIAGNOSIS — I20.89 STABLE ANGINA PECTORIS: ICD-10-CM

## 2025-03-14 LAB
AORTIC DIMENSIONLESS INDEX: 0.6 (DI)
ASCENDING AORTA: 3.5 CM
AV MEAN PRESS GRAD SYS DOP V1V2: 5 MMHG
AV VMAX SYS DOP: 157.5 CM/SEC
BH CV ECHO MEAS - AO MAX PG: 9.9 MMHG
BH CV ECHO MEAS - AO ROOT DIAM: 2.9 CM
BH CV ECHO MEAS - AO V2 VTI: 33.3 CM
BH CV ECHO MEAS - AVA(I,D): 1.88 CM2
BH CV ECHO MEAS - EDV(CUBED): 110.6 ML
BH CV ECHO MEAS - EDV(MOD-SP4): 52.4 ML
BH CV ECHO MEAS - EF(MOD-SP4): 66.2 %
BH CV ECHO MEAS - ESV(CUBED): 42.9 ML
BH CV ECHO MEAS - ESV(MOD-SP4): 17.7 ML
BH CV ECHO MEAS - FS: 27.1 %
BH CV ECHO MEAS - IVS/LVPW: 1 CM
BH CV ECHO MEAS - IVSD: 1 CM
BH CV ECHO MEAS - LA DIMENSION: 3.8 CM
BH CV ECHO MEAS - LAT PEAK E' VEL: 7.2 CM/SEC
BH CV ECHO MEAS - LV DIASTOLIC VOL/BSA (35-75): 28.2 CM2
BH CV ECHO MEAS - LV MASS(C)D: 170.2 GRAMS
BH CV ECHO MEAS - LV MAX PG: 3.8 MMHG
BH CV ECHO MEAS - LV MEAN PG: 2 MMHG
BH CV ECHO MEAS - LV SYSTOLIC VOL/BSA (12-30): 9.5 CM2
BH CV ECHO MEAS - LV V1 MAX: 95.5 CM/SEC
BH CV ECHO MEAS - LV V1 VTI: 19.9 CM
BH CV ECHO MEAS - LVIDD: 4.8 CM
BH CV ECHO MEAS - LVIDS: 3.5 CM
BH CV ECHO MEAS - LVOT AREA: 3.1 CM2
BH CV ECHO MEAS - LVOT DIAM: 2 CM
BH CV ECHO MEAS - LVPWD: 1 CM
BH CV ECHO MEAS - MED PEAK E' VEL: 5.1 CM/SEC
BH CV ECHO MEAS - MV A MAX VEL: 106 CM/SEC
BH CV ECHO MEAS - MV DEC SLOPE: 338 CM/SEC2
BH CV ECHO MEAS - MV DEC TIME: 0.22 SEC
BH CV ECHO MEAS - MV E MAX VEL: 75.5 CM/SEC
BH CV ECHO MEAS - MV E/A: 0.71
BH CV ECHO MEAS - MV MAX PG: 4.9 MMHG
BH CV ECHO MEAS - MV MEAN PG: 2 MMHG
BH CV ECHO MEAS - MV V2 VTI: 27.9 CM
BH CV ECHO MEAS - MVA(VTI): 2.24 CM2
BH CV ECHO MEAS - PA ACC TIME: 0.1 SEC
BH CV ECHO MEAS - PA V2 MAX: 105.5 CM/SEC
BH CV ECHO MEAS - RAP SYSTOLE: 3 MMHG
BH CV ECHO MEAS - RVSP: 24 MMHG
BH CV ECHO MEAS - SV(LVOT): 62.5 ML
BH CV ECHO MEAS - SV(MOD-SP4): 34.7 ML
BH CV ECHO MEAS - SVI(LVOT): 33.7 ML/M2
BH CV ECHO MEAS - SVI(MOD-SP4): 18.7 ML/M2
BH CV ECHO MEAS - TAPSE (>1.6): 2.49 CM
BH CV ECHO MEAS - TR MAX PG: 21.3 MMHG
BH CV ECHO MEAS - TR MAX VEL: 230.3 CM/SEC
BH CV ECHO MEASUREMENTS AVERAGE E/E' RATIO: 12.28
BH CV VAS BP LEFT ARM: NORMAL MMHG
BH CV XLRA - RV BASE: 3.5 CM
BH CV XLRA - RV LENGTH: 7.1 CM
BH CV XLRA - RV MID: 2.9 CM
BH CV XLRA - TDI S': 15.3 CM/SEC
IVRT: 92 MS
LEFT ATRIUM VOLUME INDEX: 20.2 ML/M2
LV EF 2D ECHO EST: 60 %

## 2025-03-14 PROCEDURE — 93306 TTE W/DOPPLER COMPLETE: CPT

## 2025-03-14 PROCEDURE — 93306 TTE W/DOPPLER COMPLETE: CPT | Performed by: INTERNAL MEDICINE

## 2025-04-21 RX ORDER — METOPROLOL SUCCINATE 25 MG/1
25 TABLET, EXTENDED RELEASE ORAL DAILY
Qty: 30 TABLET | Refills: 11 | Status: SHIPPED | OUTPATIENT
Start: 2025-04-21

## (undated) DEVICE — Device: Brand: EZ STEER NAV

## (undated) DEVICE — ST EXT IV SMRTSTE 2VLV FIX M LL 6ML 41

## (undated) DEVICE — SKIN PREP TRAY W/CHG: Brand: MEDLINE INDUSTRIES, INC.

## (undated) DEVICE — MODEL AT P65, P/N 701554-001KIT CONTENTS: HAND CONTROLLER, 3-WAY HIGH-PRESSURE STOPCOCK WITH ROTATING END AND PREMIUM HIGH-PRESSURE TUBING: Brand: ANGIOTOUCH® KIT

## (undated) DEVICE — CATH QUAD CRD 6F5MM

## (undated) DEVICE — Device: Brand: WEBSTER

## (undated) DEVICE — MODEL BT2000 P/N 700287-012KIT CONTENTS: MANIFOLD WITH SALINE AND CONTRAST PORTS, SALINE TUBING WITH SPIKE AND HAND SYRINGE, TRANSDUCER: Brand: BT2000 AUTOMATED MANIFOLD KIT

## (undated) DEVICE — DRSNG SURESITE WNDW 2.38X2.75

## (undated) DEVICE — ADULT, W/LG. BACK PAD, RADIOTRANSPARENT ELEMENT AND LEAD WIRE: Brand: DEFIBRILLATION ELECTRODES

## (undated) DEVICE — PK CATH CARD 10

## (undated) DEVICE — INTRO SHEATH FASTCATH SWARTZ SR0 .032IN 8F 63CM

## (undated) DEVICE — Device: Brand: REFERENCE PATCH CARTO 3

## (undated) DEVICE — ANGIO-SEAL VIP VASCULAR CLOSURE DEVICE: Brand: ANGIO-SEAL

## (undated) DEVICE — GW PERIPH VASC ADX J/TP SS .035 150CM 3MM

## (undated) DEVICE — PINNACLE INTRODUCER SHEATH: Brand: PINNACLE

## (undated) DEVICE — ST INF PRI SMRTSTE 20DRP 2VLV 24ML 117

## (undated) DEVICE — DRSNG SURESITE123 4X4.8IN

## (undated) DEVICE — DECANT BG O JET

## (undated) DEVICE — ST EXT IV SMARTSITE 2VLV SP M LL 5ML IV1

## (undated) DEVICE — CATH DIAG EXPO M/ PK 5F FL4/FR4 PIG

## (undated) DEVICE — CANN NASL CO2 DIVIDED A/

## (undated) DEVICE — LEX ELECTRO PHYSIOLOGY: Brand: MEDLINE INDUSTRIES, INC.

## (undated) DEVICE — INTRO SHEATH ENGAGE W/50 GW .038 7F12